# Patient Record
Sex: FEMALE | Race: WHITE | URBAN - METROPOLITAN AREA
[De-identification: names, ages, dates, MRNs, and addresses within clinical notes are randomized per-mention and may not be internally consistent; named-entity substitution may affect disease eponyms.]

---

## 2024-03-14 ENCOUNTER — INPATIENT (INPATIENT)
Facility: HOSPITAL | Age: 84
LOS: 0 days | Discharge: HOME | DRG: 86 | End: 2024-03-15
Attending: SURGERY | Admitting: SURGERY
Payer: MEDICARE

## 2024-03-14 VITALS
DIASTOLIC BLOOD PRESSURE: 86 MMHG | SYSTOLIC BLOOD PRESSURE: 134 MMHG | OXYGEN SATURATION: 98 % | RESPIRATION RATE: 18 BRPM | HEIGHT: 68 IN | HEART RATE: 91 BPM | TEMPERATURE: 98 F | WEIGHT: 166.01 LBS

## 2024-03-14 DIAGNOSIS — S06.5X0A TRAUMATIC SUBDURAL HEMORRHAGE WITHOUT LOSS OF CONSCIOUSNESS, INITIAL ENCOUNTER: ICD-10-CM

## 2024-03-14 DIAGNOSIS — Z90.49 ACQUIRED ABSENCE OF OTHER SPECIFIED PARTS OF DIGESTIVE TRACT: Chronic | ICD-10-CM

## 2024-03-14 LAB
ALBUMIN SERPL ELPH-MCNC: 3.9 G/DL — SIGNIFICANT CHANGE UP (ref 3.5–5.2)
ALP SERPL-CCNC: 135 U/L — HIGH (ref 30–115)
ALT FLD-CCNC: 52 U/L — HIGH (ref 0–41)
ANION GAP SERPL CALC-SCNC: 12 MMOL/L — SIGNIFICANT CHANGE UP (ref 7–14)
APTT BLD: 39.6 SEC — HIGH (ref 27–39.2)
AST SERPL-CCNC: 78 U/L — HIGH (ref 0–41)
BASOPHILS # BLD AUTO: 0.02 K/UL — SIGNIFICANT CHANGE UP (ref 0–0.2)
BASOPHILS NFR BLD AUTO: 0.3 % — SIGNIFICANT CHANGE UP (ref 0–1)
BILIRUB SERPL-MCNC: 2.6 MG/DL — HIGH (ref 0.2–1.2)
BUN SERPL-MCNC: 20 MG/DL — SIGNIFICANT CHANGE UP (ref 10–20)
CALCIUM SERPL-MCNC: 9.7 MG/DL — SIGNIFICANT CHANGE UP (ref 8.4–10.5)
CHLORIDE SERPL-SCNC: 99 MMOL/L — SIGNIFICANT CHANGE UP (ref 98–110)
CO2 SERPL-SCNC: 26 MMOL/L — SIGNIFICANT CHANGE UP (ref 17–32)
CREAT SERPL-MCNC: 0.7 MG/DL — SIGNIFICANT CHANGE UP (ref 0.7–1.5)
EGFR: 85 ML/MIN/1.73M2 — SIGNIFICANT CHANGE UP
EOSINOPHIL # BLD AUTO: 0.02 K/UL — SIGNIFICANT CHANGE UP (ref 0–0.7)
EOSINOPHIL NFR BLD AUTO: 0.3 % — SIGNIFICANT CHANGE UP (ref 0–8)
GLUCOSE SERPL-MCNC: 115 MG/DL — HIGH (ref 70–99)
HCT VFR BLD CALC: 38.6 % — SIGNIFICANT CHANGE UP (ref 37–47)
HGB BLD-MCNC: 12.9 G/DL — SIGNIFICANT CHANGE UP (ref 12–16)
IMM GRANULOCYTES NFR BLD AUTO: 0.3 % — SIGNIFICANT CHANGE UP (ref 0.1–0.3)
INR BLD: 2.54 RATIO — HIGH (ref 0.65–1.3)
LYMPHOCYTES # BLD AUTO: 1.17 K/UL — LOW (ref 1.2–3.4)
LYMPHOCYTES # BLD AUTO: 16.9 % — LOW (ref 20.5–51.1)
MCHC RBC-ENTMCNC: 31.2 PG — HIGH (ref 27–31)
MCHC RBC-ENTMCNC: 33.4 G/DL — SIGNIFICANT CHANGE UP (ref 32–37)
MCV RBC AUTO: 93.2 FL — SIGNIFICANT CHANGE UP (ref 81–99)
MONOCYTES # BLD AUTO: 0.42 K/UL — SIGNIFICANT CHANGE UP (ref 0.1–0.6)
MONOCYTES NFR BLD AUTO: 6.1 % — SIGNIFICANT CHANGE UP (ref 1.7–9.3)
NEUTROPHILS # BLD AUTO: 5.27 K/UL — SIGNIFICANT CHANGE UP (ref 1.4–6.5)
NEUTROPHILS NFR BLD AUTO: 76.1 % — HIGH (ref 42.2–75.2)
NRBC # BLD: 0 /100 WBCS — SIGNIFICANT CHANGE UP (ref 0–0)
PLATELET # BLD AUTO: 136 K/UL — SIGNIFICANT CHANGE UP (ref 130–400)
PMV BLD: 11.3 FL — HIGH (ref 7.4–10.4)
POTASSIUM SERPL-MCNC: 4.1 MMOL/L — SIGNIFICANT CHANGE UP (ref 3.5–5)
POTASSIUM SERPL-SCNC: 4.1 MMOL/L — SIGNIFICANT CHANGE UP (ref 3.5–5)
PROT SERPL-MCNC: 7.2 G/DL — SIGNIFICANT CHANGE UP (ref 6–8)
PROTHROM AB SERPL-ACNC: 29.2 SEC — HIGH (ref 9.95–12.87)
RBC # BLD: 4.14 M/UL — LOW (ref 4.2–5.4)
RBC # FLD: 15.3 % — HIGH (ref 11.5–14.5)
SODIUM SERPL-SCNC: 137 MMOL/L — SIGNIFICANT CHANGE UP (ref 135–146)
WBC # BLD: 6.92 K/UL — SIGNIFICANT CHANGE UP (ref 4.8–10.8)
WBC # FLD AUTO: 6.92 K/UL — SIGNIFICANT CHANGE UP (ref 4.8–10.8)

## 2024-03-14 PROCEDURE — 70450 CT HEAD/BRAIN W/O DYE: CPT | Mod: 26,MC

## 2024-03-14 PROCEDURE — 71045 X-RAY EXAM CHEST 1 VIEW: CPT | Mod: 26

## 2024-03-14 PROCEDURE — 99285 EMERGENCY DEPT VISIT HI MDM: CPT | Mod: FS,57

## 2024-03-14 PROCEDURE — 73110 X-RAY EXAM OF WRIST: CPT | Mod: RT

## 2024-03-14 PROCEDURE — 72125 CT NECK SPINE W/O DYE: CPT | Mod: 26,MC

## 2024-03-14 PROCEDURE — 72170 X-RAY EXAM OF PELVIS: CPT | Mod: 26

## 2024-03-14 PROCEDURE — 97163 PT EVAL HIGH COMPLEX 45 MIN: CPT | Mod: GP

## 2024-03-14 PROCEDURE — 70486 CT MAXILLOFACIAL W/O DYE: CPT | Mod: 26,MC

## 2024-03-14 PROCEDURE — 99222 1ST HOSP IP/OBS MODERATE 55: CPT | Mod: GC

## 2024-03-14 PROCEDURE — 73110 X-RAY EXAM OF WRIST: CPT | Mod: 26,RT

## 2024-03-14 PROCEDURE — 25675 CLTX DSTL RAD/ULN DISLC MNPJ: CPT | Mod: RT

## 2024-03-14 PROCEDURE — 73110 X-RAY EXAM OF WRIST: CPT | Mod: 26,RT,77

## 2024-03-14 PROCEDURE — 70450 CT HEAD/BRAIN W/O DYE: CPT | Mod: MC

## 2024-03-14 PROCEDURE — 25605 CLTX DST RDL FX/EPHYS SEP W/: CPT | Mod: 54,RT

## 2024-03-14 RX ORDER — SACUBITRIL AND VALSARTAN 24; 26 MG/1; MG/1
1 TABLET, FILM COATED ORAL
Refills: 0 | DISCHARGE

## 2024-03-14 RX ORDER — ACETAMINOPHEN 500 MG
975 TABLET ORAL ONCE
Refills: 0 | Status: COMPLETED | OUTPATIENT
Start: 2024-03-14 | End: 2024-03-14

## 2024-03-14 RX ORDER — METOPROLOL TARTRATE 50 MG
1 TABLET ORAL
Refills: 0 | DISCHARGE

## 2024-03-14 RX ORDER — SACUBITRIL AND VALSARTAN 24; 26 MG/1; MG/1
1 TABLET, FILM COATED ORAL
Refills: 0 | Status: DISCONTINUED | OUTPATIENT
Start: 2024-03-14 | End: 2024-03-15

## 2024-03-14 RX ORDER — SODIUM CHLORIDE 9 MG/ML
1000 INJECTION, SOLUTION INTRAVENOUS
Refills: 0 | Status: DISCONTINUED | OUTPATIENT
Start: 2024-03-14 | End: 2024-03-15

## 2024-03-14 RX ORDER — GABAPENTIN 400 MG/1
100 CAPSULE ORAL THREE TIMES A DAY
Refills: 0 | Status: DISCONTINUED | OUTPATIENT
Start: 2024-03-14 | End: 2024-03-15

## 2024-03-14 RX ORDER — TETANUS TOXOID, REDUCED DIPHTHERIA TOXOID AND ACELLULAR PERTUSSIS VACCINE, ADSORBED 5; 2.5; 8; 8; 2.5 [IU]/.5ML; [IU]/.5ML; UG/.5ML; UG/.5ML; UG/.5ML
0.5 SUSPENSION INTRAMUSCULAR ONCE
Refills: 0 | Status: COMPLETED | OUTPATIENT
Start: 2024-03-14 | End: 2024-03-14

## 2024-03-14 RX ORDER — METOPROLOL TARTRATE 50 MG
25 TABLET ORAL DAILY
Refills: 0 | Status: DISCONTINUED | OUTPATIENT
Start: 2024-03-14 | End: 2024-03-15

## 2024-03-14 RX ORDER — ACETAMINOPHEN 500 MG
650 TABLET ORAL EVERY 6 HOURS
Refills: 0 | Status: DISCONTINUED | OUTPATIENT
Start: 2024-03-14 | End: 2024-03-15

## 2024-03-14 RX ORDER — FUROSEMIDE 40 MG
1 TABLET ORAL
Refills: 0 | DISCHARGE

## 2024-03-14 RX ORDER — METHOCARBAMOL 500 MG/1
500 TABLET, FILM COATED ORAL
Refills: 0 | Status: DISCONTINUED | OUTPATIENT
Start: 2024-03-14 | End: 2024-03-15

## 2024-03-14 RX ORDER — FUROSEMIDE 40 MG
20 TABLET ORAL DAILY
Refills: 0 | Status: DISCONTINUED | OUTPATIENT
Start: 2024-03-14 | End: 2024-03-15

## 2024-03-14 RX ADMIN — SODIUM CHLORIDE 110 MILLILITER(S): 9 INJECTION, SOLUTION INTRAVENOUS at 23:58

## 2024-03-14 RX ADMIN — TETANUS TOXOID, REDUCED DIPHTHERIA TOXOID AND ACELLULAR PERTUSSIS VACCINE, ADSORBED 0.5 MILLILITER(S): 5; 2.5; 8; 8; 2.5 SUSPENSION INTRAMUSCULAR at 18:04

## 2024-03-14 RX ADMIN — Medication 650 MILLIGRAM(S): at 23:56

## 2024-03-14 RX ADMIN — Medication 975 MILLIGRAM(S): at 22:59

## 2024-03-14 RX ADMIN — METHOCARBAMOL 500 MILLIGRAM(S): 500 TABLET, FILM COATED ORAL at 23:56

## 2024-03-14 NOTE — H&P ADULT - ATTENDING COMMENTS
Trauma Attending H&P Attestation    Patient seen and evaluated with the trauma team in the trauma bay upon arrival. All pertinent labs and radiographic imaging reviewed, pending final reports. Outpatient medications reviewed, including the presence of anticoagulants, if applicable. I agree with the resident's note above, including the physical exam findings, assessment and plan as documented with the following adjustments.     Trauma Level: [ ] Code  [x ] Alert  [ ] Consult [ ] Transfer in  Patient is seen at the bedside at 21:25  Activation by:  [ x] ED physician [x ] EMS  Intubated in Field? [ ] Yes [x ] No  Intubated in ED? [ ] Yes [x ] No  Intubated in Trauma [place]? [ ] Yes [x ] No    JCOY SOUSA Patient is a 83y old  Female who presents with a chief complaint of s/p fall with small SDH and wrist fracture      Patient presented with GCS [15 ]  upon arrival to the trauma bay.  Allergies  No Known Allergies  Intolerances    PAST MEDICAL & SURGICAL HISTORY:  HTN (hypertension)  Atrial fibrillation  S/P cholecystectomy    On AC/Antiplatelets [x ] Yes [ ] No              [x ] NOVACs, [ ] Coumadin, [ ] ASA, [ ] Antiplatelets     Vital Signs Last 24 Hrs  T(C): 35.9 (14 Mar 2024 21:15), Max: 36.7 (14 Mar 2024 18:53)  T(F): 96.7 (14 Mar 2024 21:15), Max: 98 (14 Mar 2024 18:53)  HR: 86 (14 Mar 2024 21:15) (84 - 91)  BP: 128/72 (14 Mar 2024 21:15) (105/74 - 134/86)  BP(mean): --  RR: 17 (14 Mar 2024 21:07) (17 - 18)  SpO2: 98% (14 Mar 2024 21:15) (96% - 99%)    Parameters below as of 14 Mar 2024 21:15  Patient On (Oxygen Delivery Method): room air      PE: wrist deformity  Assessment: Fall on Xarelto                             wrist fracture                             small SDH  PLAN  - Admit to Trauma service/ASF  - supportive care  - GI/DVT prophylaxis  - pain management  - repeat studies as needed  - complete and follow up on trauma work up included but not limites to                          [x ] CXR [ x] PXR [x ] Extremities X-RAYs                          [ x] NCHCT [x ] C-Spine CT [x] CT fascial bones [ x] CT Chest [x ] CT Abdomen/Pelvis                          [ x] FAST [ ] Other                          [x ] Trauma Labs   [ ] Toxicology   - Follow up Consults  [ x] Neurosurgery [x ] Orthopaedics [ ] Plastics [ ] Fascial/OMFS [ ] Opthalmology   [ ] Urology  [ ] ENT                                          [ ] Medicine [ ] Geriatrics [ ] Cardiology/EP [ ] Hospice/Palliative Care                                        [ ] Pediatric ICU  [ ] SICU/SDU [ ] Burn/Burn ICU  - IV ABx give as indicated [ ] Yes [x ] No  - Tetanus given as indicated [ ] Yes [ x] No  - Seizures prophylaxis  [ ] Yes,  [x ] No  - repeat NCHCT      Ashley Pereyra MD, FACS  Trauma/ACS/Surgical Critical Care Attending

## 2024-03-14 NOTE — ED ADULT NURSE NOTE - NSSEPSISSUSPECTED_ED_A_ED
No S/p Peg 8/26 by GI   MBS 9/28: Inconsistent Aspiration episodes with varying consistencies   Continue Non-Oral means of Nutrition  -Speech and swallow reconsulted 10/9, recommend MBS, however stated chances of passing study improved if trach decannulated.

## 2024-03-14 NOTE — H&P ADULT - NSHPPHYSICALEXAM_GEN_ALL_CORE
General: NAD  HEENT: Normocephalic, atraumatic, EOMI, PEERLA. no scalp lacerations, dried epistaxis from R nare, small abrasion over R cheek  Neck: Soft, midline trachea. no c-spine tenderness  Chest: No chest wall tenderness, no subcutaneous emphysema   Cardiac: Extremities well perfused  Respiratory: Normal respiratory effort  Abdomen: Soft, non-distended, non-tender, no rebound, no guarding.  Groin: Normal appearing, pelvis stable   Ext: RUE in split, full ROM, capillary refill <2 secs, sensation grossly intact,  moving b/l upper and lower extremities. Palpable Radial b/l UE, b/l DP palpable in LE.   Back: No T/L/S spine tenderness, No palpable runoff/stepoff/deformity      Vital Signs Last 24 Hrs  T(C): 35.9 (14 Mar 2024 21:15), Max: 36.7 (14 Mar 2024 18:53)  T(F): 96.7 (14 Mar 2024 21:15), Max: 98 (14 Mar 2024 18:53)  HR: 86 (14 Mar 2024 21:15) (84 - 91)  BP: 128/72 (14 Mar 2024 21:15) (105/74 - 134/86)  BP(mean): --  RR: 17 (14 Mar 2024 21:07) (17 - 18)  SpO2: 98% (14 Mar 2024 21:15) (96% - 99%)    Parameters below as of 14 Mar 2024 21:15  Patient On (Oxygen Delivery Method): room air

## 2024-03-14 NOTE — ED PROVIDER NOTE - PROGRESS NOTE DETAILS
call from radiologist regarding thin SDH.  Pt and family made aware of findings.  Pt splinted. Will transfer [facility]. Dr Lira aware. Dr Coleman accepts DC: patient arrived north. asymptomatic. last dose of xarelto was last night. Nsx, evaluated patient, no need to reverse AC at this time. xrays with displaced colles, will attempt reduction. ct max/face, ct cervical spine added.

## 2024-03-14 NOTE — ED PROVIDER NOTE - CARE PLAN
Principal Discharge DX:	Traumatic subdural hematoma without loss of consciousness  Secondary Diagnosis:	Closed traumatic displaced Colles' fracture of right radius   1

## 2024-03-14 NOTE — ED PROVIDER NOTE - CLINICAL SUMMARY MEDICAL DECISION MAKING FREE TEXT BOX
Patient transferred [place] for NSX/trauma eval.   84 yo F With history of A-fib on Xarelto, last dose was last night presenting to the ED for mechanical fall sustaining superficial lacerations to the bridge of the nose.  No C.  CT head with small subdural hematoma as well as a displaced right Colles' fracture.  Patient transferred [place], neurosurgery and trauma evaluated patient, CT max face as well as CT C-spine with no acute pathology.  Chest x-ray images independently interpreted by me Dr. Mendoza showing no pneumothorax, effusions noted- patient denies sob/cough. Colles fracture attempted to reduce in ED. ortho consult appreciated. admitted to SDU for further management.

## 2024-03-14 NOTE — H&P ADULT - NSHPLABSRESULTS_GEN_ALL_CORE
Labs:  CAPILLARY BLOOD GLUCOSE                          12.9   6.92  )-----------( 136      ( 14 Mar 2024 19:33 )             38.6       Auto Neutrophil %: 76.1 % (03-14-24 @ 19:33)  Auto Immature Granulocyte %: 0.3 % (03-14-24 @ 19:33)    03-14    137  |  99  |  20  ----------------------------<  115<H>  4.1   |  26  |  0.7      Calcium: 9.7 mg/dL (03-14-24 @ 19:33)      LFTs:             7.2  | 2.6  | 78       ------------------[135     ( 14 Mar 2024 19:33 )  3.9  | x    | 52          Lipase:x      Amylase:x             Coags:     29.20  ----< 2.54    ( 14 Mar 2024 19:33 )     39.6          Urinalysis Basic - ( 14 Mar 2024 19:33 )    Color: x / Appearance: x / SG: x / pH: x  Gluc: 115 mg/dL / Ketone: x  / Bili: x / Urobili: x   Blood: x / Protein: x / Nitrite: x   Leuk Esterase: x / RBC: x / WBC x   Sq Epi: x / Non Sq Epi: x / Bacteria: x    Radiolgy: Labs:  CAPILLARY BLOOD GLUCOSE                          12.9   6.92  )-----------( 136      ( 14 Mar 2024 19:33 )             38.6       Auto Neutrophil %: 76.1 % (03-14-24 @ 19:33)  Auto Immature Granulocyte %: 0.3 % (03-14-24 @ 19:33)    03-14    137  |  99  |  20  ----------------------------<  115<H>  4.1   |  26  |  0.7      Calcium: 9.7 mg/dL (03-14-24 @ 19:33)      LFTs:             7.2  | 2.6  | 78       ------------------[135     ( 14 Mar 2024 19:33 )  3.9  | x    | 52          Lipase:x      Amylase:x             Coags:     29.20  ----< 2.54    ( 14 Mar 2024 19:33 )     39.6          Urinalysis Basic - ( 14 Mar 2024 19:33 )    Color: x / Appearance: x / SG: x / pH: x  Gluc: 115 mg/dL / Ketone: x  / Bili: x / Urobili: x   Blood: x / Protein: x / Nitrite: x   Leuk Esterase: x / RBC: x / WBC x   Sq Epi: x / Non Sq Epi: x / Bacteria: x    Radiolgy:  CT Head No Cont (03.14.24 @ 17:55)     Thin acute subdural hemorrhage noted along the anterior falx without mass   effect or midline shift.    Mild chronic microvascular type changes.      Xray Chest 1 View-PORTABLE IMMEDIATE (Xray Chest 1 View-PORTABLE IMMEDIATE .) (03.14.24 @ 19:04)     Mild congestive changes with small bilateral pleural effusions      Xray Pelvis AP only (03.14.24 @ 19:05)    Study limited by overlying external material/clothing, particularly   evaluation of the bilateral femurs is limited. Otherwise, no definitive   evidence of acute displaced fracture elsewhere. Degenerative changes.   Osteopenia. Pelvic calcifications.       CT Cervical Spine No Cont (03.14.24 @ 21:48)      No evidence of acute fracture of the cervical spine.      CT Maxillofacial No Cont (03.14.24 @ 21:48)    Unremarkable CT maxillofacial.

## 2024-03-14 NOTE — ED PROVIDER NOTE - OBJECTIVE STATEMENT
84 y/o female on xarelto presents to the ed s/p mechanical fall today on sidewalk . patient with right wrist pain and swelling. no tingling distally. no elbow or shoulder pain. no neck or back pain . patient ambulatory . no headaches, dizziness, visual changes. patient with abrasion and swelling to lower lip. patient with good rom of jaw.

## 2024-03-14 NOTE — ED ADULT NURSE REASSESSMENT NOTE - NS ED NURSE REASSESS COMMENT FT1
Report given to GWENDOLYN Ignacio RN. Vital signs and history was given. Tylenol 650mg and robaxin 500mg oral will be given prior to pt being transferred upstairs.

## 2024-03-14 NOTE — CONSULT NOTE ADULT - SUBJECTIVE AND OBJECTIVE BOX
Neurosurgery  Consult    Patient is a 83y old  Female who presents with a chief complaint of fall     HPI:  82 y/o female on xarelto presents to the ed s/p mechanical fall today on sidewalk . patient with right wrist pain and swelling. no tingling distally. no elbow or shoulder pain. no neck or back pain . Patient ambulatory . no headaches, dizziness, visual changes. patient with abrasion and swelling to lower lip. patient with good rom of jaw.          PAST MEDICAL & SURGICAL HISTORY:  HTN (hypertension)  Atrial fibrillation          FAMILY HISTORY:      Social History: (-) x 3    Allergies    No Known Allergies    Intolerances        MEDICATIONS  (STANDING):    MEDICATIONS  (PRN):      Review of systems:    Constitutional: No fever, weight loss or fatigue    Eyes: No eye pain or discharge  ENMT:  No difficulty hearing; No sinus or throat pain  Neck: No pain or stiffness  Respiratory: No cough, wheezing, chills or hemoptysis  Cardiovascular: No chest pain, palpitations, shortness of breath, dyspnea on exertion  Gastrointestinal: No abdominal pain, nausea, vomiting or hematemesis; No diarrhea or constipation.   Genitourinary: No dysuria, frequency, hematuria or incontinence  Neurological: As per HPI  Skin: No rashes or lesions   Endocrine: No heat or cold intolerance; No hair loss  Musculoskeletal: No joint pain or swelling  Psychiatric: No depression, anxiety, mood swings  Heme/Lymph: No easy bruising or bleeding gums    Vital Signs Last 24 Hrs  T(C): 35.9 (14 Mar 2024 21:15), Max: 36.7 (14 Mar 2024 18:53)  T(F): 96.7 (14 Mar 2024 21:15), Max: 98 (14 Mar 2024 18:53)  HR: 86 (14 Mar 2024 21:15) (84 - 91)  BP: 128/72 (14 Mar 2024 21:15) (105/74 - 134/86)  BP(mean): --  RR: 17 (14 Mar 2024 21:07) (17 - 18)  SpO2: 98% (14 Mar 2024 21:15) (96% - 99%)    Parameters below as of 14 Mar 2024 21:15  Patient On (Oxygen Delivery Method): room air        Neurologic Examination:  General:  Appearance is consistent with chronologic age.  No abnormal facies.   General: The patient is oriented to person, place, time and date.  Recent and remote memory intact.  Fund of knowledge is intact and normal.  Language with normal repetition, comprehension and naming.  Nondysarthric.    Cranial nerves: intact VA, VFF.  EOMI w/o nystagmus, skew or reported double vision.  PERRL.  No ptosis/weakness of eyelid closure.  Facial sensation is normal with normal bite.  No facial asymmetry.  Hearing grossly intact b/l.  Palate elevates midline.    Motor examination:   Normal tone, bulk and range of motion.  No tenderness, twitching, tremors or involuntary movements.  Formal Muscle Strength Testing: LOPEZ X 4 RUE in splint/broken.  No observable drift.  Sensory examination:   Intact to light touch and pinprick in all extremities.  Cerebellum:   FTN/HKS intact with normal SAUL in all limbs.  No dysmetria or dysdiadokinesia.    Labs:   CBC Full  -  ( 14 Mar 2024 19:33 )  WBC Count : 6.92 K/uL  RBC Count : 4.14 M/uL  Hemoglobin : 12.9 g/dL  Hematocrit : 38.6 %  Platelet Count - Automated : 136 K/uL  Mean Cell Volume : 93.2 fL  Mean Cell Hemoglobin : 31.2 pg  Mean Cell Hemoglobin Concentration : 33.4 g/dL  Auto Neutrophil # : 5.27 K/uL  Auto Lymphocyte # : 1.17 K/uL  Auto Monocyte # : 0.42 K/uL  Auto Eosinophil # : 0.02 K/uL  Auto Basophil # : 0.02 K/uL  Auto Neutrophil % : 76.1 %  Auto Lymphocyte % : 16.9 %  Auto Monocyte % : 6.1 %  Auto Eosinophil % : 0.3 %  Auto Basophil % : 0.3 %    03-14    137  |  99  |  20  ----------------------------<  115<H>  4.1   |  26  |  0.7    Ca    9.7      14 Mar 2024 19:33    TPro  7.2  /  Alb  3.9  /  TBili  2.6<H>  /  DBili  x   /  AST  78<H>  /  ALT  52<H>  /  AlkPhos  135<H>  03-14    LIVER FUNCTIONS - ( 14 Mar 2024 19:33 )  Alb: 3.9 g/dL / Pro: 7.2 g/dL / ALK PHOS: 135 U/L / ALT: 52 U/L / AST: 78 U/L / GGT: x           PT/INR - ( 14 Mar 2024 19:33 )   PT: 29.20 sec;   INR: 2.54 ratio         PTT - ( 14 Mar 2024 19:33 )  PTT:39.6 sec  Urinalysis Basic - ( 14 Mar 2024 19:33 )    Color: x / Appearance: x / SG: x / pH: x  Gluc: 115 mg/dL / Ketone: x  / Bili: x / Urobili: x   Blood: x / Protein: x / Nitrite: x   Leuk Esterase: x / RBC: x / WBC x   Sq Epi: x / Non Sq Epi: x / Bacteria: x          Neuroimaging:  NCHCT: CT Head No Cont:   ACC: [id number] EXAM:  CT BRAIN   ORDERED BY: MAURA ORTEZ     PROCEDURE DATE:  03/14/2024          INTERPRETATION:  CLINICAL INDICATION: Head injury.    Technique: CT of the head was performed without contrast.    Multiple contiguous axial imageswere acquired from the skullbase to the   vertex without the administration of intravenous contrast.  Coronal and   sagittal reformations were made.    COMPARISON:  prior head CT dated 6/28/2011.    FINDINGS:  There is thin acute subdural hemorrhage noted along the anterior falx   measuring approximately 2 mm without mass effect or midline shift, series   2 image 8.    There is patchy periventricular and subcortical white matter hypodensity.   Ventricles and sulci are otherwise unremarkable. There is no   hydrocephalus.    The calvarium is intact. The visualized intraorbital compartments,   paranasal sinuses and mastoid complexes appear free of acute disease.    IMPRESSION:  Thin acute subdural hemorrhage noted along the anterior falx without mass   effect or midline shift.    Mild chronic microvascular type changes.    These findings were discussed with Dr. Rincon at 3/14/2024 6:38 PM by   Dr. Perez with read back confirmation.    --- End of Report ---      JAE PEREZ MD; Attending Radiologist  This document has been electronically signed. Mar 14 2024  6:39PM (03-14-24 @ 17:55)        Assessment:  This is a 83y Female with h/o HTN, afib on AC presents after a mechanical fall with skim acute SDH along the flax    Plan:  No acute neurosurgical intervention warranted at this time           Rpt HCT in 6-8H           Neuro checks q 4H           No reversal  - last dose of Xarelto last night > 24H Hold further doses of Xarelto           Keep HOB at 30           Control SBP below 160      Case discussed and films reviewed with Dr Merrill.  -   03-14-24 @ 22:05

## 2024-03-14 NOTE — H&P ADULT - ASSESSMENT
TRAUMA ACTIVATION LEVEL:  CODE / ALERT  / CONSULT  ACTIVATED BY: EMS**  /  ED**  INTUBATED: YES** / NO**      MECHANISM OF INJURY:   [] Blunt     [] MVC	  [] Fall	  [] Pedestrian Struck	  [] Motorcycle     [] Assault     [] Bicycle collision    [] Sports injury    [] Penetrating    [] Gun Shot Wound      [] Stab Wound    GCS: 15 	E: 4	V: 5	M: 6    HPI:    83yF w/ PMHx of *** seen as (Code Trauma / Trauma Alert / Trauma Consult) s/p ******.  Trauma assessment in ED: ABCs intact , GCS 15 , AAOx3.    PAST MEDICAL & SURGICAL HISTORY:  HTN (hypertension)      Atrial fibrillation      S/P cholecystectomy          Allergies    No Known Allergies    Intolerances        Home Medications:  Entresto 24 mg-26 mg oral tablet: 1 tab(s) orally 2 times a day (14 Mar 2024 23:11)  furosemide 20 mg oral tablet: 1 tab(s) orally once a day (14 Mar 2024 23:12)  metoprolol succinate 25 mg oral capsule, extended release: 1 cap(s) orally once a day (14 Mar 2024 23:12)  Xarelto 20 mg oral tablet: 1 tab(s) orally once a day (14 Mar 2024 23:11)      ROS: 10-system review is otherwise negative except HPI above.      Primary Survey:    A - airway intact  B - bilateral breath sounds and good chest rise  C - palpable pulses in all extremities  D - GCS 15 on arrival, LOPEZ  Exposure obtained            Secondary Survey:   General: NAD  HEENT: Normocephalic, atraumatic, EOMI, PEERLA. no scalp lacerations   Neck: Soft, midline trachea. no c-spine tenderness  Chest: No chest wall tenderness, no subcutaneous emphysema   Cardiac: S1, S2, RRR  Respiratory: Bilateral breath sounds, clear and equal bilaterally  Abdomen: Soft, non-distended, non-tender, no rebound, no guarding.  Groin: Normal appearing, pelvis stable   Ext:  Moving b/l upper and lower extremities. Palpable Radial b/l UE, b/l DP palpable in LE.   Back: No T/L/S spine tenderness, No palpable runoff/stepoff/deformity  Rectal: No juliet blood, GINA with good tone    FAST: *****/Negative    ACCESS / DEVICES:  [ ] Peripheral IV  [ ] Central Venous Line	[ ] R	[ ] L	[ ] IJ	[ ] Fem	[ ] SC	Placed:   [ ] Arterial Line		[ ] R	[ ] L	[ ] Fem	[ ] Rad	[ ] Ax	Placed:   [ ] PICC:					[ ] Mediport  [ ] Urinary Catheter,  Date Placed:   [ ] Chest tube: [ ] Right, [ ] Left  [ ] MARCIO/Kevin Drains    Labs:  CAPILLARY BLOOD GLUCOSE                              12.9   6.92  )-----------( 136      ( 14 Mar 2024 19:33 )             38.6       Auto Neutrophil %: 76.1 % (03-14-24 @ 19:33)  Auto Immature Granulocyte %: 0.3 % (03-14-24 @ 19:33)    03-14    137  |  99  |  20  ----------------------------<  115<H>  4.1   |  26  |  0.7      Calcium: 9.7 mg/dL (03-14-24 @ 19:33)      LFTs:             7.2  | 2.6  | 78       ------------------[135     ( 14 Mar 2024 19:33 )  3.9  | x    | 52          Lipase:x      Amylase:x             Coags:     29.20  ----< 2.54    ( 14 Mar 2024 19:33 )     39.6                Urinalysis Basic - ( 14 Mar 2024 19:33 )    Color: x / Appearance: x / SG: x / pH: x  Gluc: 115 mg/dL / Ketone: x  / Bili: x / Urobili: x   Blood: x / Protein: x / Nitrite: x   Leuk Esterase: x / RBC: x / WBC x   Sq Epi: x / Non Sq Epi: x / Bacteria: x                RADIOLOGY & ADDITIONAL STUDIES:  ---------------------------------------------------------------------------------------    ASSESSMENT:  83y Female  w/ PMHx of *** seen as (Code Trauma / Trauma Alert / Trauma Consult) s/p ****** with complaint of *** , external signs of trauma include *** . Trauma assessment in ED: ABCs intact , GCS 15 , AAOx3,  LOPEZ.     Injuries identified:   -   -   -     PLAN:   - Trauma Labs: (CBC, BMP, Coags, T&S, UA, EtOH level)  Additional studies:  EKG  Utox    Trauma Imaging to include the following:  - CXR, Pelvic Xray  - CT Head,  CT C-spine, CT Max/Face, CT Chest, CT Abd/Pelvis  - Extremity films: None    Additional consultations:  - Neurosurgery  - Orthopedics  - OMFS  - PT/Rehab/SW  - Hospitalist/Medicine     Disposition pending results of above labs and imaging  Above plan discussed with Trauma attending,  ***  , patient, patient family, and ED team  --------------------------------------------------------------------------------------  03-14-24 @ 23:14 TRAUMA ACTIVATION LEVEL:  CODE / ALERT  / CONSULT  ACTIVATED BY: EMS**  /  ED**  INTUBATED: YES** / NO**      MECHANISM OF INJURY:   [] Blunt     [] MVC	  [] Fall	  [] Pedestrian Struck	  [] Motorcycle     [] Assault     [] Bicycle collision    [] Sports injury    [] Penetrating    [] Gun Shot Wound      [] Stab Wound    GCS: 15 	E: 4	V: 5	M: 6    HPI:    83yF w/ PMHx of *** seen as (Code Trauma / Trauma Alert / Trauma Consult) s/p ******.  Trauma assessment in ED: ABCs intact , GCS 15 , AAOx3.    PAST MEDICAL & SURGICAL HISTORY:  HTN (hypertension)      Atrial fibrillation      S/P cholecystectomy          Allergies    No Known Allergies    Intolerances        Home Medications:  Entresto 24 mg-26 mg oral tablet: 1 tab(s) orally 2 times a day (14 Mar 2024 23:11)  furosemide 20 mg oral tablet: 1 tab(s) orally once a day (14 Mar 2024 23:12)  metoprolol succinate 25 mg oral capsule, extended release: 1 cap(s) orally once a day (14 Mar 2024 23:12)  Xarelto 20 mg oral tablet: 1 tab(s) orally once a day (14 Mar 2024 23:11)      ROS: 10-system review is otherwise negative except HPI above.      Primary Survey:    A - airway intact  B - bilateral breath sounds and good chest rise  C - palpable pulses in all extremities  D - GCS 15 on arrival, LOPEZ  Exposure obtained            Secondary Survey:   General: NAD  HEENT: Normocephalic, atraumatic, EOMI, PEERLA. no scalp lacerations   Neck: Soft, midline trachea. no c-spine tenderness  Chest: No chest wall tenderness, no subcutaneous emphysema   Cardiac: S1, S2, RRR  Respiratory: Bilateral breath sounds, clear and equal bilaterally  Abdomen: Soft, non-distended, non-tender, no rebound, no guarding.  Groin: Normal appearing, pelvis stable   Ext:  Moving b/l upper and lower extremities. Palpable Radial b/l UE, b/l DP palpable in LE.   Back: No T/L/S spine tenderness, No palpable runoff/stepoff/deformity  Rectal: No juliet blood, GINA with good tone    FAST: *****/Negative    ACCESS / DEVICES:  [ ] Peripheral IV  [ ] Central Venous Line	[ ] R	[ ] L	[ ] IJ	[ ] Fem	[ ] SC	Placed:   [ ] Arterial Line		[ ] R	[ ] L	[ ] Fem	[ ] Rad	[ ] Ax	Placed:   [ ] PICC:					[ ] Mediport  [ ] Urinary Catheter,  Date Placed:   [ ] Chest tube: [ ] Right, [ ] Left  [ ] MARCIO/Kevin Drains                    RADIOLOGY & ADDITIONAL STUDIES:  ---------------------------------------------------------------------------------------    ASSESSMENT:  83y Female  w/ PMHx of *** seen as (Code Trauma / Trauma Alert / Trauma Consult) s/p ****** with complaint of *** , external signs of trauma include *** . Trauma assessment in ED: ABCs intact , GCS 15 , AAOx3,  LOPEZ.     Injuries identified:   -   -   -     PLAN:   - Trauma Labs: (CBC, BMP, Coags, T&S, UA, EtOH level)  Additional studies:  EKG  Utox    Trauma Imaging to include the following:  - CXR, Pelvic Xray  - CT Head,  CT C-spine, CT Max/Face, CT Chest, CT Abd/Pelvis  - Extremity films: None    Additional consultations:  - Neurosurgery  - Orthopedics  - OMFS  - PT/Rehab/SW  - Hospitalist/Medicine     Disposition pending results of above labs and imaging  Above plan discussed with Trauma attending,  ***  , patient, patient family, and ED team  --------------------------------------------------------------------------------------  03-14-24 @ 23:14 Assessment:  Patient is a 82 y/o F with a past medical history significant for Atrial fibrillation on xarelto, HTN, and s/p cholecystectomy x 30 yrs prior who presents to the ED as a transfer from [facility] as trauma alert with chief complaint of R wrist pain s/p mechanical fall.  Trauma assessment in ED: ABCs intact , GCS 15 , AAOx3. On imaging studies, patient found to have thin acute subdural hemorrhage along the anterior falx as well as distal R ulnar fracture s/p ED splinting. Patient currently without focal neurological deficits, changes in strength or sensation, and is AOx3 GCS 15, without headache or dizziness. Continues to have RUE pain, s/p splinting in ED.      Plan:  - Admit to floor under trauma surgery service  - Q4hr neurochecks, repeat CT head 6-8hrs from previous CT head, cont hold xarelto for 24 hrs, BP control  - Orthopedics c/s for R distal ulnar fx  - Monitor vitals and respiratory status  - Pain control  - Encourage ambulation as tolerated  - Monitor urine output  - Strict IOs    Disposition pending results of above labs and imaging  Above plan discussed with Trauma attending, Dr. Pereyra  , patient, patient family, and ED team  --------------------------------------------------------------------------------------  03-14-24 @ 23:14    Fatigue: How much time during the previous 4 weeks did you feel tired?   All or most of the time [   ] Yes (1pt)    [x  ] No  (0pts)  Resistance: Do you have any difficulty walking up 10 steps alone without resting and without aids? [   ] Yes (1pt)    [  x] No  (0pts)  Ambulation: Do you have any difficulty walking several hundred yards alone without aids? [x   ] Yes (1pt)    [  ] No  (0pts)  Illness: how many illnesses do you have out of list of 11 total? [   ] 5 or more (1pt) [ x ] < 5 (0pts)  Loss of weight: Have you had weight loss of 5% or more? [   ] Yes (1pt)    [ x ] No  (0pts)    Total Score: 1    Score 1-2: Consult medical comangement  Score 3-4: Consult Geriatric service   Score 5: Consult Palliative service [id number]    Vitcoriano Dunbar, Iván WOOD, Amadou DUENAS, Max BLANKENSHIP. Frality: toward a clinical definition. J AM Med Dir Assoc. 2008; 9 (2): 71-72  Therese JE, Nicole TK, Mercado DK. A simple frailty questionnaire (FRAIL) predicts outcomes in middle aged  Americans. J Nutr Health Aging. 2012; 16 (7): 601-608 Assessment:  Patient is a 82 y/o F with a past medical history significant for Atrial fibrillation on xarelto, HTN, and s/p cholecystectomy x 30 yrs prior who presents to the ED as a transfer from [facility] as trauma alert with chief complaint of R wrist pain s/p mechanical fall.  Trauma assessment in ED: ABCs intact , GCS 15 , AAOx3. On imaging studies, patient found to have thin acute subdural hemorrhage along the anterior falx as well as distal R ulnar fracture s/p ED splinting. Patient currently without focal neurological deficits, changes in strength or sensation, and is AOx3 GCS 15, without headache or dizziness. Continues to have RUE pain, s/p splinting in ED.      Plan:  - Admit to floor under trauma surgery service  - Q4hr neurochecks, repeat CT head 6-8hrs from previous CT head, cont hold xarelto for 24 hrs, BP control  - Orthopedics c/s for R distal ulnar fx, s/p ED splinting of RUE, f/u repeat RUE plain films  - Monitor vitals and respiratory status  - Pain control  - Encourage ambulation as tolerated  - Monitor urine output  - Strict IOs    Disposition pending results of above labs and imaging  Above plan discussed with Trauma attending, Dr. Pereyra  , patient, patient family, and ED team  --------------------------------------------------------------------------------------  03-14-24 @ 23:14    Fatigue: How much time during the previous 4 weeks did you feel tired?   All or most of the time [   ] Yes (1pt)    [x  ] No  (0pts)  Resistance: Do you have any difficulty walking up 10 steps alone without resting and without aids? [   ] Yes (1pt)    [  x] No  (0pts)  Ambulation: Do you have any difficulty walking several hundred yards alone without aids? [x   ] Yes (1pt)    [  ] No  (0pts)  Illness: how many illnesses do you have out of list of 11 total? [   ] 5 or more (1pt) [ x ] < 5 (0pts)  Loss of weight: Have you had weight loss of 5% or more? [   ] Yes (1pt)    [ x ] No  (0pts)    Total Score: 1    Score 1-2: Consult medical comangement  Score 3-4: Consult Geriatric service   Score 5: Consult Palliative service [id number]    Victoriano Hubbard G, Iván WOOD, Amadou DUENAS, Max B. Frality: toward a clinical definition. J AM Med Dir Assoc. 2008; 9 (2): 71-72  Therese JE, Nicole TK, Mercado DK. A simple frailty questionnaire (FRAIL) predicts outcomes in middle aged  Americans. J Nutr Health Aging. 2012; 16 (7): 601-608 Assessment:  Patient is a 82 y/o F with a past medical history significant for Atrial fibrillation on xarelto, HTN, and s/p cholecystectomy x 30 yrs prior who presents to the ED as a transfer from [facility] as trauma alert with chief complaint of R wrist pain s/p mechanical fall.  Trauma assessment in ED: ABCs intact , GCS 15 , AAOx3. On imaging studies, patient found to have thin acute subdural hemorrhage along the anterior falx as well as distal R ulnar fracture s/p ED splinting. Patient currently without focal neurological deficits, changes in strength or sensation, and is AOx3 GCS 15, without headache or dizziness. Continues to have RUE pain, s/p splinting in ED.      Plan:  - Admit to floor under trauma surgery service  - Q4hr neurochecks, repeat CT head 6-8hrs from previous CT head, cont hold xarelto for 24 hrs, BP control  - Orthopedics c/s for R distal ulnar fx, s/p ED reduction and splinting of RUE, f/u repeat RUE plain films  - Monitor vitals and respiratory status  - Pain control  - Encourage ambulation as tolerated  - Monitor urine output  - Strict IOs    Disposition pending results of above labs and imaging  Above plan discussed with Trauma attending, Dr. Pereyra  , patient, patient family, and ED team  --------------------------------------------------------------------------------------  03-14-24 @ 23:14    Fatigue: How much time during the previous 4 weeks did you feel tired?   All or most of the time [   ] Yes (1pt)    [x  ] No  (0pts)  Resistance: Do you have any difficulty walking up 10 steps alone without resting and without aids? [   ] Yes (1pt)    [  x] No  (0pts)  Ambulation: Do you have any difficulty walking several hundred yards alone without aids? [x   ] Yes (1pt)    [  ] No  (0pts)  Illness: how many illnesses do you have out of list of 11 total? [   ] 5 or more (1pt) [ x ] < 5 (0pts)  Loss of weight: Have you had weight loss of 5% or more? [   ] Yes (1pt)    [ x ] No  (0pts)    Total Score: 1    Score 1-2: Consult medical comangement  Score 3-4: Consult Geriatric service   Score 5: Consult Palliative service [id number]    Victoriano Hubbard G, Iván WOOD, Amadou DUENAS, Max B. Frality: toward a clinical definition. J AM Med Dir Assoc. 2008; 9 (2): 71-72  Therese JE, Nicole TK, Mercado DK. A simple frailty questionnaire (FRAIL) predicts outcomes in middle aged  Americans. J Nutr Health Aging. 2012; 16 (7): 601-608

## 2024-03-14 NOTE — ED ADULT NURSE NOTE - NSSUHOSCREENINGYN_ED_ALL_ED
"35w6d    Chief Complaint   Patient presents with     Prenatal Care      discuss induction--took amoxicillan but still uncomfortabe then was prescribed diflucan on Friday but that didn't help either--still very uncomfortable       Initial /72 (BP Location: Right arm, Patient Position: Sitting, Cuff Size: Adult Regular)   Ht 1.676 m (5' 6\")   Wt 84.8 kg (186 lb 14.4 oz)   LMP  (LMP Unknown)   BMI 30.17 kg/m   Estimated body mass index is 30.17 kg/m  as calculated from the following:    Height as of this encounter: 1.676 m (5' 6\").    Weight as of this encounter: 84.8 kg (186 lb 14.4 oz).  BP completed using cuff size: regular    Questioned patient about current smoking habits.  Pt. has never smoked.          The following HM Due: NONE         " Yes - the patient is able to be screened

## 2024-03-14 NOTE — ED PROVIDER NOTE - PHYSICAL EXAMINATION
generalized: comfortable, alert , normocephalic  eyes: PERRLA, EOMI, no orbital tenderness, no bony step off  ENT: no septal hematoma, no nasal bone tenderness, +laceration to lower lip with surrounding abrasion, no dental injury, no gum swelling, no tongue swelling and uvula midline, oropharynx clear,   resp: CTA, no bony step off , no chest wall tenderness, no bruising to chest wall  cardiac: RRR S1S2  abd: non tender RUQ or LUQ, non distended, no bruising   msk: neck supple, no cervical tenderness, pelvis stable , no vertebral tenderness- flexion and extension in tact, gait steady, upper extremities - +tenderness right wrist with swelling and deformity, good rom of digits, pulses in tact, lower extremities- good rom , no tenderness  skin: no lacerations, bruising or swelling   neuro: alert and oriented, follows commands, no sensory or motor deficits

## 2024-03-14 NOTE — ED PROVIDER NOTE - ATTENDING APP SHARED VISIT CONTRIBUTION OF CARE
82 yo F PMHx noted including a fib on Xarelto., presents from [facility] for further evaluation s/p fall.  Pt tripped and fell, injured rt wrist and lip.  no LOC, able to ambulate afterwards.  Needs Tetanus. On exam pt in NAD AAO x 3, GCS 15, + swelling with bruising to lip, + small laceration to inner mucosa of lower lip, dentition intact, no midline vertebral tenderness, lungs equal ae b/l, abd soft nt nd, + swelling with bruising to rt wrist, dorsal hand, + tender wrist, + distal pulses, clavicles intact,

## 2024-03-14 NOTE — ED ADULT NURSE REASSESSMENT NOTE - NS ED NURSE REASSESS COMMENT FT1
pt transfer from [facility] fro subdural hematoma. pt A&Ox4. denies any CP, SOB, N/V/D

## 2024-03-14 NOTE — H&P ADULT - HISTORY OF PRESENT ILLNESS
Patient is a 82 y/o F with a past medical history significant for Atrial fibrillation on xarelto, HTN, and s/p cholecystectomy x 30 yrs prior who presents to the ED as a transfer from [facility] as trauma alert with chief complaint of R wrist pain s/p mechanical fall. Patient states that while outside her home, she tripped over curb, and landed onto R side. Patient states that she fell unto her R hand as she tried to brace her fall. Patient states that she hit her face during fall, without LOC, patient currently taking xarelto, last dose 3/13. Patient was able to stand and ambulate with assistance after fall. Patient ambulates without assistance at baseline. Patient originally seen at [facility], where her R wrist was splinted. CT imaging at [facility] site showed evidence of thin acute subdural hemorrhage along the anterior falx which prompted transfer to [facility]. Patient currently endorsing moderate R wrist pain which is worse with movement or palpation, denies changes in sensation in the extremity Patient denies HA, dizziness, weakness, changes in vision, nausea/vomiting, changes in sensation. Patient denies fevers, chills, chest pain, palpitations, shortness of breath, abd pain. Patient is a 84 y/o F with a past medical history significant for Atrial fibrillation on xarelto, HTN, and s/p cholecystectomy x 30 yrs prior who presents to the ED as a transfer from [facility] as trauma alert with chief complaint of R wrist pain s/p mechanical fall.  Trauma assessment in ED: ABCs intact , GCS 15 , AAOx3. Patient states that while outside her home, she tripped over curb, and landed onto R side. Patient states that she fell unto her R hand as she tried to brace her fall. Patient states that she hit her face during fall, without LOC, patient currently taking xarelto, last dose 3/13. Patient was able to stand and ambulate with assistance after fall. Patient ambulates without assistance at baseline. Patient originally seen at [facility], where her R wrist was splinted. CT imaging at [facility] site showed evidence of thin acute subdural hemorrhage along the anterior falx which prompted transfer to [facility]. Patient currently endorsing moderate R wrist pain which is worse with movement or palpation, denies changes in sensation in the extremity Patient denies HA, dizziness, weakness, changes in vision, nausea/vomiting, changes in sensation. Patient denies fevers, chills, chest pain, palpitations, shortness of breath, abd pain.

## 2024-03-15 ENCOUNTER — TRANSCRIPTION ENCOUNTER (OUTPATIENT)
Age: 84
End: 2024-03-15

## 2024-03-15 VITALS — DIASTOLIC BLOOD PRESSURE: 70 MMHG | HEART RATE: 98 BPM | SYSTOLIC BLOOD PRESSURE: 119 MMHG | TEMPERATURE: 97 F

## 2024-03-15 PROCEDURE — 99233 SBSQ HOSP IP/OBS HIGH 50: CPT

## 2024-03-15 PROCEDURE — 73110 X-RAY EXAM OF WRIST: CPT | Mod: 26,RT

## 2024-03-15 PROCEDURE — 70450 CT HEAD/BRAIN W/O DYE: CPT | Mod: 26

## 2024-03-15 PROCEDURE — 99238 HOSP IP/OBS DSCHRG MGMT 30/<: CPT

## 2024-03-15 RX ORDER — ACETAMINOPHEN 500 MG
2 TABLET ORAL
Qty: 0 | Refills: 0 | DISCHARGE
Start: 2024-03-15

## 2024-03-15 RX ORDER — RIVAROXABAN 15 MG-20MG
1 KIT ORAL
Refills: 0 | DISCHARGE

## 2024-03-15 RX ORDER — ENOXAPARIN SODIUM 100 MG/ML
40 INJECTION SUBCUTANEOUS EVERY 24 HOURS
Refills: 0 | Status: DISCONTINUED | OUTPATIENT
Start: 2024-03-15 | End: 2024-03-15

## 2024-03-15 RX ADMIN — GABAPENTIN 100 MILLIGRAM(S): 400 CAPSULE ORAL at 13:30

## 2024-03-15 RX ADMIN — SACUBITRIL AND VALSARTAN 1 TABLET(S): 24; 26 TABLET, FILM COATED ORAL at 17:22

## 2024-03-15 RX ADMIN — ENOXAPARIN SODIUM 40 MILLIGRAM(S): 100 INJECTION SUBCUTANEOUS at 17:22

## 2024-03-15 RX ADMIN — Medication 650 MILLIGRAM(S): at 07:39

## 2024-03-15 RX ADMIN — Medication 650 MILLIGRAM(S): at 17:23

## 2024-03-15 RX ADMIN — Medication 650 MILLIGRAM(S): at 17:22

## 2024-03-15 RX ADMIN — Medication 650 MILLIGRAM(S): at 06:40

## 2024-03-15 RX ADMIN — SODIUM CHLORIDE 110 MILLILITER(S): 9 INJECTION, SOLUTION INTRAVENOUS at 06:40

## 2024-03-15 RX ADMIN — SACUBITRIL AND VALSARTAN 1 TABLET(S): 24; 26 TABLET, FILM COATED ORAL at 06:40

## 2024-03-15 RX ADMIN — METHOCARBAMOL 500 MILLIGRAM(S): 500 TABLET, FILM COATED ORAL at 06:40

## 2024-03-15 RX ADMIN — Medication 650 MILLIGRAM(S): at 11:46

## 2024-03-15 RX ADMIN — Medication 20 MILLIGRAM(S): at 06:40

## 2024-03-15 RX ADMIN — METHOCARBAMOL 500 MILLIGRAM(S): 500 TABLET, FILM COATED ORAL at 11:33

## 2024-03-15 RX ADMIN — Medication 650 MILLIGRAM(S): at 11:33

## 2024-03-15 RX ADMIN — GABAPENTIN 100 MILLIGRAM(S): 400 CAPSULE ORAL at 06:40

## 2024-03-15 RX ADMIN — Medication 25 MILLIGRAM(S): at 06:40

## 2024-03-15 RX ADMIN — METHOCARBAMOL 500 MILLIGRAM(S): 500 TABLET, FILM COATED ORAL at 17:21

## 2024-03-15 NOTE — DISCHARGE NOTE PROVIDER - PROVIDER TOKENS
PROVIDER:[TOKEN:[[id number]],FOLLOWUP:[1 week]],PROVIDER:[TOKEN:[[id number]],FOLLOWUP:[2 weeks]]

## 2024-03-15 NOTE — DISCHARGE NOTE PROVIDER - NSDCCAREPROVSEEN_GEN_ALL_CORE_FT
Assessment:  82 y/o female, S/P Fall.  Right Distal Radius and Ulna Fracture.  S/P Closed reduction.  Traumatic SDH.    PLAN:  Patient remained neurologically stable.  Repeat Ct head - stable SDH.  Ok for discharge

## 2024-03-15 NOTE — DISCHARGE NOTE PROVIDER - EXTENDED VTE YES NO FOR MLM ENOXAPARIN
Lon Del Rosario [phone]  70 YOUR PATIENT IS HURTING, NEEDS TO SPEAK TO YOU Paged at number :  PAGE: [phone] at :  ZMW- 21:11 ,

## 2024-03-15 NOTE — CONSULT NOTE ADULT - ASSESSMENT
IMPRESSION: Rehab of multitrauma / s/p fall with traumatic SDH, R ulnar fx, facial contusion / Atrial fibrillation on xarelto, HTN, and s/p cholecystectomy     PRECAUTIONS: [   ] Cardiac  [   ] Respiratory  [   ] Seizures [   ] Contact Isolation  [   ] Droplet Isolation  [   ] Other    Weight Bearing Status: NWB R wrist     RECOMMENDATION:    Out of Bed to Chair     DVT/Decubiti Prophylaxis    REHAB PLAN:     [  x  ] Bedside P/T 3-5 times a week   [ x   ]   Bedside O/T  2-3 times a week             [    ] Speech Therapy               [    ]  No Rehab Therapy Indicated   Conditioning/ROM                                    ADL  Bed Mobility                                               Conditioning/ROM  Transfers                                                     Bed Mobility  Sitting /Standing Balance                         Transfers                                        Gait Training                                               Sitting/Standing Balance  Stair Training [   ]Applicable                    Home equipment Eval                                                                        Splinting  [   ] Only      GOALS:   ADL   [ x   ]   Independent                    Transfers  [ x   ] Independent                          Ambulation  [   x ] Independent     [  x   ] With device                            [    ]  CG                                                         [    ]  CG                                                                  [    ] CG                            [    ] Min A                                                   [    ] Min A                                                              [    ] Min  A          DISCHARGE PLAN:   [    ]  Good candidate for Intensive Rehabilitation/Hospital based                                             Will tolerate 3hrs Intensive Rehab Daily                                       [ x    ]  Short Term Rehab in Skilled Nursing Facility                             vs          [   x  ]  Home with Outpatient or VN services                                         [     ]  Possible Candidate for Intensive Hospital based Rehab

## 2024-03-15 NOTE — DISCHARGE NOTE PROVIDER - CARE PROVIDER_API CALL
Ese Merrill  Neurosurgery  [address], [address]  [place] [address]  Phone: [phone]  Fax: [phone]  Follow Up Time: 1 week    Maynor Rivera  Orthopaedic Surgery  [address] [place] [id number]  Phone: [phone]  Fax: [phone]  Follow Up Time: 2 weeks

## 2024-03-15 NOTE — PHYSICAL THERAPY INITIAL EVALUATION ADULT - GAIT DEVIATIONS NOTED, PT EVAL
decreased adri/decreased velocity of limb motion/decreased step length/decreased stride length/decreased weight-shifting ability

## 2024-03-15 NOTE — DISCHARGE NOTE NURSING/CASE MANAGEMENT/SOCIAL WORK - NSDCPEFALRISK_GEN_ALL_CORE
Awake For information on Fall & Injury Prevention, visit: [email or web address] OR  [email or web address] OR  https://www.cdc.gov/steadi/patient.html

## 2024-03-15 NOTE — DISCHARGE NOTE PROVIDER - CARE PROVIDERS DIRECT ADDRESSES
[email or web address] [email or web address]

## 2024-03-15 NOTE — PHYSICAL THERAPY INITIAL EVALUATION ADULT - GAIT TRAINING, PT EVAL
Increase ambulation using a cane or no assistive device X 100 feet  with  supervision  assist by d/c.

## 2024-03-15 NOTE — CONSULT NOTE ADULT - SUBJECTIVE AND OBJECTIVE BOX
HPI:  Patient is a 82 y/o F with a past medical history significant for Atrial fibrillation on xarelto, HTN, and s/p cholecystectomy x 30 yrs prior who presents to the ED as a transfer from [facility] as trauma alert with chief complaint of R wrist pain s/p mechanical fall.  Trauma assessment in ED: ABCs intact , GCS 15 , AAOx3. Patient states that while outside her home, she tripped over curb, and landed onto R side. Patient states that she fell unto her R hand as she tried to brace her fall. Patient states that she hit her face during fall, without LOC, patient currently taking xarelto, last dose 3/13. Patient was able to stand and ambulate with assistance after fall. Patient ambulates without assistance at baseline. Patient originally seen at [facility], where her R wrist was splinted. CT imaging at [facility] site showed evidence of thin acute subdural hemorrhage along the anterior falx which prompted transfer to [facility]. Patient currently endorsing moderate R wrist pain which is worse with movement or palpation, denies changes in sensation in the extremity Patient denies HA, dizziness, weakness, changes in vision, nausea/vomiting, changes in sensation. Patient denies fevers, chills, chest pain, palpitations, shortness of breath, abd pain.    On imaging studies, patient found to have thin acute subdural hemorrhage along the anterior falx as well as distal R ulnar fracture s/p ED splinting. NWB right wrist       PAST MEDICAL & SURGICAL HISTORY:  HTN (hypertension)      Atrial fibrillation      S/P cholecystectomy          Hospital Course:    TODAY'S SUBJECTIVE & REVIEW OF SYMPTOMS:     Constitutional WNL   Cardio WNL   Resp WNL   GI WNL  Heme WNL  Endo WNL  Skin WNL  MSK right wrist pain   Neuro WNL  Cognitive WNL  Psych WNL      MEDICATIONS  (STANDING):  acetaminophen     Tablet .. 650 milliGRAM(s) Oral every 6 hours  furosemide    Tablet 20 milliGRAM(s) Oral daily  gabapentin 100 milliGRAM(s) Oral three times a day  lactated ringers. 1000 milliLiter(s) (110 mL/Hr) IV Continuous <Continuous>  methocarbamol 500 milliGRAM(s) Oral four times a day  metoprolol succinate ER 25 milliGRAM(s) Oral daily  sacubitril 24 mG/valsartan 26 mG 1 Tablet(s) Oral two times a day    MEDICATIONS  (PRN):      FAMILY HISTORY:      Allergies    No Known Allergies    Intolerances        SOCIAL HISTORY:    [  ] Etoh  [  ] Smoking  [  ] Substance abuse     Home Environment:  [   ] Home Alone  [ x  ] Lives with Family  [   ] Home Health Aid    Dwelling:  [   ] Apartment  [ x  ] Private House  [   ] Adult Home  [   ] Skilled Nursing Facility      [   ] Short Term  [   ] Long Term  [  x ] Stairs       Elevator [   ]    FUNCTIONAL STATUS PTA: (Check all that apply)  Ambulation: [ x   ]Independent    [   ] Dependent     [   ] Non-Ambulatory  Assistive Device: [   ] SA Cane  [   ]  Q Cane  [   ] Walker  [   ]  Wheelchair  ADL : [x   ] Independent  [    ]  Dependent       Vital Signs Last 24 Hrs  T(C): 35.6 (15 Mar 2024 07:19), Max: 36.7 (14 Mar 2024 18:53)  T(F): 96.1 (15 Mar 2024 07:19), Max: 98 (14 Mar 2024 18:53)  HR: 73 (15 Mar 2024 07:19) (73 - 91)  BP: 114/72 (15 Mar 2024 07:19) (105/74 - 134/86)  BP(mean): --  RR: 18 (15 Mar 2024 04:00) (17 - 18)  SpO2: 95% (15 Mar 2024 04:00) (95% - 99%)    Parameters below as of 15 Mar 2024 04:00  Patient On (Oxygen Delivery Method): room air          PHYSICAL EXAM: Awake & Alert  GENERAL: NAD  HEAD:  Normocephalic, facial contusion   CHEST/LUNG: Clear   HEART: S1S2+  ABDOMEN: Soft, Nontender  EXTREMITIES:  no calf tenderness, right wrist in splint     NERVOUS SYSTEM:  Cranial Nerves 2-12 intact [   ] Abnormal  [   ]  ROM: WFL all extremities [   ]  Abnormal [ x  ]limited RUE   Motor Strength: WFL all extremities  [   ]  Abnormal [x   ]limited RUE  Sensation: intact to light touch [ x  ] Abnormal [   ]    FUNCTIONAL STATUS:  Bed Mobility: Independent [   ]  Supervision [ x  ]  Needs Assistance [   ]  N/A [   ]  Transfers: Independent [   ]  Supervision [ x  ]  Needs Assistance [   ]  N/A [   ]   Ambulation: Independent [   ]  Supervision [   ]  Needs Assistance [  x ]  N/A [   ]  ADL: Independent [   ] Requires Assistance [   ] N/A [   ]      LABS:                        12.9   6.92  )-----------( 136      ( 14 Mar 2024 19:33 )             38.6     03-14    137  |  99  |  20  ----------------------------<  115<H>  4.1   |  26  |  0.7    Ca    9.7      14 Mar 2024 19:33    TPro  7.2  /  Alb  3.9  /  TBili  2.6<H>  /  DBili  x   /  AST  78<H>  /  ALT  52<H>  /  AlkPhos  135<H>  03-14    PT/INR - ( 14 Mar 2024 19:33 )   PT: 29.20 sec;   INR: 2.54 ratio         PTT - ( 14 Mar 2024 19:33 )  PTT:39.6 sec  Urinalysis Basic - ( 14 Mar 2024 19:33 )    Color: x / Appearance: x / SG: x / pH: x  Gluc: 115 mg/dL / Ketone: x  / Bili: x / Urobili: x   Blood: x / Protein: x / Nitrite: x   Leuk Esterase: x / RBC: x / WBC x   Sq Epi: x / Non Sq Epi: x / Bacteria: x        RADIOLOGY & ADDITIONAL STUDIES:

## 2024-03-15 NOTE — PATIENT PROFILE ADULT - FALL HARM RISK - HARM RISK INTERVENTIONS

## 2024-03-15 NOTE — CONSULT NOTE ADULT - ASSESSMENT
Patient is a 82 y/o F with a past medical history significant for Atrial fibrillation on xarelto, HTN, and s/p cholecystectomy x 30 yrs prior who presents to the ED as a transfer from [facility] as trauma alert with chief complaint of R wrist pain s/p mechanical fall.     # Mechanical fall  # Acute SDH   # Right distal radius fracture  - CT Head No Cont (03.15.24 @ 00:57) >Since 3/14/2024, the small anterior interhemispheric subdural hematoma   has improved  - xarelto held  - neurosurgery eval:  No acute neurosurgical intervention warranted at this time  - Control SBP below 160  - ortho eval: R distal radius fx, now s/p closed reduction and splinting.  NWB RUE in splint  - pain control  - evaluated by PT  - management as per primary team    # Chronic afib, rate controlled  # Chronic systolic CHF , no echo in records  - xarelto held sec to SDH  - c/w metoprolol  - pt on lasix, entresto  - dc IV fluids  0- 2 D echo    # Transaminitis  - monitor LFT    # Full code    # Pendin D echo, monitor LFT

## 2024-03-15 NOTE — DISCHARGE NOTE PROVIDER - HOSPITAL COURSE
82 y/o F with a past medical history significant for Atrial fibrillation on xarelto, HTN, and s/p cholecystectomy x 30 yrs prior who presents to the ED as a transfer from [facility] as trauma alert with chief complaint of R wrist pain s/p mechanical fall.  Trauma assessment in ED: ABCs intact , GCS 15 , AAOx3. Patient states that while outside her home, she tripped over curb, and landed onto R side. Patient states that she fell unto her R hand as she tried to brace her fall. Patient states that she hit her face during fall, without LOC, patient currently taking xarelto, last dose 3/13. Patient was able to stand and ambulate with assistance after fall. Patient ambulates without assistance at baseline. Patient originally seen at [facility], where her R wrist was splinted. CT imaging at [facility] site showed evidence of thin acute subdural hemorrhage along the anterior falx which prompted transfer to [facility]. Patient currently endorsing moderate R wrist pain which is worse with movement or palpation, denies changes in sensation in the extremity Patient denies HA, dizziness, weakness, changes in vision, nausea/vomiting, changes in sensation. Patient denies fevers, chills, chest pain, palpitations, shortness of breath, abd pain. Trauma work up revealed Left distal radius and ulna fx, splinted by ED, evaluated by orthopedics. Small SDH cleared for Q4 neurochecks by neurosurgery- had stable head CT. The patient was admitted, worked with PT. Lives at home alone, but her daughter lives in the apartment above her and will care for her. She is medically cleared for discharge HD2. Will follow up outpatient with neurosurgery and orthopedics.

## 2024-03-15 NOTE — CONSULT NOTE ADULT - SUBJECTIVE AND OBJECTIVE BOX
Patient is a 83y old  Female who presents with a chief complaint of s/p fall (14 Mar 2024 21:55)    Patient was seen and examined.  Denies any complaints.  All systems reviewed    PAST MEDICAL & SURGICAL HISTORY:  HTN (hypertension)  Atrial fibrillation  S/P cholecystectomy    Allergies  No Known Allergies    MEDICATIONS  (STANDING):  acetaminophen     Tablet .. 650 milliGRAM(s) Oral every 6 hours  furosemide    Tablet 20 milliGRAM(s) Oral daily  gabapentin 100 milliGRAM(s) Oral three times a day  lactated ringers. 1000 milliLiter(s) (110 mL/Hr) IV Continuous <Continuous>  methocarbamol 500 milliGRAM(s) Oral four times a day  metoprolol succinate ER 25 milliGRAM(s) Oral daily  sacubitril 24 mG/valsartan 26 mG 1 Tablet(s) Oral two times a day    MEDICATIONS  (PRN):    Vital Signs Last 24 Hrs  T(C): 35.6  T(F): 96.1  HR: 73  BP: 114/72  BP(mean): --  RR: 18  SpO2: 95%    O/E:  Awake, alert, not in distress.  HEENT: EOMI. bruising around lower lip  Chest: clear.  CVS: SIS2 +, irregular no murmur.  P/A: Soft, BS+  CNS:  awake, alert  Ext: no edema feet.,  RUE in splint  All systems reviewed positive findings as above.                            12.9   6.92  )-----------( 136      ( 14 Mar 2024 19:33 )             38.6     03-14    137  |  99  |  20  ----------------------------<  115<H>  4.1   |  26  |  0.7    Ca    9.7      14 Mar 2024 19:33    TPro  7.2  /  Alb  3.9  /  TBili  2.6<H>  /  DBili  x   /  AST  78<H>  /  ALT  52<H>  /  AlkPhos  135<H>  03-14          PT/INR - ( 14 Mar 2024 19:33 )   PT: 29.20 sec;   INR: 2.54 ratio         PTT - ( 14 Mar 2024 19:33 )  PTT:39.6 sec  Urinalysis Basic - ( 14 Mar 2024 19:33 )    Color: x / Appearance: x / SG: x / pH: x  Gluc: 115 mg/dL / Ketone: x  / Bili: x / Urobili: x   Blood: x / Protein: x / Nitrite: x   Leuk Esterase: x / RBC: x / WBC x   Sq Epi: x / Non Sq Epi: x / Bacteria: x

## 2024-03-15 NOTE — DISCHARGE NOTE PROVIDER - NSDCMRMEDTOKEN_GEN_ALL_CORE_FT
acetaminophen 325 mg oral tablet: 2 tab(s) orally every 6 hours  Entresto 24 mg-26 mg oral tablet: 1 tab(s) orally 2 times a day  furosemide 20 mg oral tablet: 1 tab(s) orally once a day  metoprolol succinate 25 mg oral capsule, extended release: 1 cap(s) orally once a day

## 2024-03-15 NOTE — DISCHARGE NOTE NURSING/CASE MANAGEMENT/SOCIAL WORK - PATIENT PORTAL LINK FT
You can access the FollowMyHealth Patient Portal offered by [facility] by registering at the following website: [email or web address]. By joining [employer]’s FollowMyHealth portal, you will also be able to view your health information using other applications (apps) compatible with our system.

## 2024-03-15 NOTE — PATIENT PROFILE ADULT - SURGICAL SITE INCISION
[facility] AMBULATORY ENCOUNTER  ENT OUTPATIENT CONSULTATION    SUBJECTIVE:    Denver Benitez is a 39 year old male who presents on request of Dr. Oseguera for further evaluation and management of his nose.  The patient has had seasonal problems with epistaxis for the last few years.  It is worse in the winter.  It is on the left side.  Sometimes the patient will wake up with blood on his pillow.  His last bleed was 6 weeks ago.  In addition, the patient does have an area of irritation/scabbing along the floor of the nose on the left side but he wishes to have checked out.  Patient denies other exacerbating or relieving factors.  He denies other associated symptoms.    PAST HISTORIES:  I have reviewed the past medical history, family history, social history, medications and allergies listed in the medical record as obtained by my nursing staff and support staff and agree with their documentation.     REVIEW OF SYSTEMS:     Comprehensive ROS reviewed with patient.  Pertinent positives are listed in HPI.    PHYSICAL EXAM:    Vital Signs:   height is 5' 11\" (1.803 m) and weight is 116.1 kg. His pulse is 108 (abnormal). His oxygen saturation is 97%.   Constitutional:  Patient is a well-nourished, well-developed 39 year old male in no distress with fluent speech, strong voice, no stridor, and unlabored respirations.  Affect is calm and patient is alert.   Neurologic/Psych:  Alert, appropriate mood and affect.  Respiratory:  No accessory muscle use.  Face/Head:  Normocephalic.  No masses.  Face is strong and symmetric.  Light reflexes are symmetric.  Pupils equal, round.  Extraocular motions intact.  Irides normal.  Other cranial nerves III-XII grossly intact.      EARS:    Left auricle:  Normally-formed.  No lesions.  Left external canal:  No edema, erythema or exudate.  Left tympanic membrane:  Tympanic membrane translucent, mobile, without retraction or effusion.  Normal landmarks present.   Right auricle:  Normally-formed.   No lesions.  Right external canal:  No edema, erythema or exudate.  Right tympanic membrane:  Tympanic membrane translucent, mobile, without retraction or effusion.  Normal landmarks present.     NOSE:  External:  No lesion.  Septum:  No gross deformity.  In the left Kiesselbach's plexus region, there is a tiny daly of dried blood that is seen.  This is gently wiped off with a cotton tip applicator.  Underneath, there is no significant abnormality seen.  No crusting, area, or prominent vessels.  In the left inferior nasal vestibule, there is some mild vestibulitis seen.  Turbinates:  Inferior turbinates are normal bilaterally.  Mucosa:  No lesions visible.  No unusual discharge.     ORAL CAVITY:     Lips:  Mobile.  No lesions.  Teeth:  Dentition in good repair.  Gingivae:  Without lesions.  Tongue:  Anterior tongue mobile.  Symmetric.  Without lesions.   Floor of mouth:  Normal structures, no mucosal lesions.  Hard palate:  Symmetric without lesions.  Mucosa:  Moist, pink.  Without lesions or exudate.    OROPHARYNX:  Tongue base:  Symmetric.  Mobile.  Without lesion.  Tonsils:  No masses or lesions seen.  Soft palate:  Mobile.  Symmetric.  No lesion.  Normally-formed uvula.  Mucosa:  No exudate.  Moist.     NECK:  External:  No external visualized lesions or abnormalities.  Palpation:  Neck supple.  There is no crepitus.  Trachea midline.  No palpable thyroid masses or gross enlargement.  Major salivary glands normal to palpation. No masses or lymphadenopathy.      ASSESSMENT:    1. Epistaxis    2. Nasal vestibulitis    Epistaxis, now resolved.  Source was probably from the left Kiesselbach's plexus region, but no abnormality is currently seen.  Last bleed was 6 weeks ago.  The bleeding does tend to happen more often in the winter.  Mild nasal vestibulitis, left nasal cavity.      PLAN:    For the left nasal septum, I advised the patient to apply Vaseline gently with a Q-tip on a nightly basis.  For the left floor  of the nose, I advised the patient to apply Neosporin ointment 2 times a day for a couple weeks, then consider maintenance treatment with nightly Vaseline.  Return visit on a p.r.n. basis for any further problems, particularly if he starts developing epistaxis next winter despite use of the Vaseline.    Instructions provided as documented in the After Visit Summary.    The patient indicates understanding of the diagnosis and agrees with the plan of care.             no

## 2024-03-15 NOTE — CHART NOTE - NSCHARTNOTEFT_GEN_A_CORE
ORTHOPAEDIC SURGERY NOTE    Patient seen and examined at bedside this morning after ortho was consulted last night due to R distal radius fx. Patient was closed reduced and splinted in the ED. She is endorsing some pain in the R distal radius. Denies pain elsewhere. Denies numbness/tingling.    EXAM  RUE  Splint in place  Swelling of R hand/ digits  NVI    LUE  Skin intact  No swelling/warmth  NTTP   Painless ROM shoulder/elbow/wrist  NVI    BUE  Skin intact  No swelling/warmth  NTTP   Painless ROM hip/knee/ankle  NVI    A&P  83F w/ R distal radius fx, now s/p closed reduction and splinting. Discussed with patient that her fracture is currently amenable to non-operative treatment. Reviewed with patient that we will monitor her with serial XR to confirm that she is healing appropriately, in acceptable alignment.   - NWB RUE in splint  - pain control  - upon discharge please follow up in 1 week with Dr. Vance at [address] [facility]; please call to schedule an appointment: [phone]
Cleared for DVT ppx by neurosurgery, discussed with Neurosurgery Jody JOSE.    Estefany Ford, PA-C  [id number]

## 2024-03-15 NOTE — PHYSICAL THERAPY INITIAL EVALUATION ADULT - GENERAL OBSERVATIONS, REHAB EVAL
950-1030 am Chart reviewed. Pt. seen semirecline in bed, in No apparent distress , + IV lock , Prima fit device , right forearm splint and ace bandage, Pt. agreed to activity/therapy.

## 2024-03-15 NOTE — DISCHARGE NOTE NURSING/CASE MANAGEMENT/SOCIAL WORK - NSDCVIVACCINE_GEN_ALL_CORE_FT
Tdap; 14-Mar-2024 18:04; Harriett Chen (LEORA); [employer];   [id number]   (Exp. Date: 14-Dec-2025); IntraMuscular; Deltoid Right.; 0.5 milliLiter(s); VIS (VIS Published: 09-May-2013, VIS Presented: 14-Mar-2024);

## 2024-03-15 NOTE — PATIENT PROFILE ADULT - NSTOBACCONEVERSMOKERY/N_GEN_A
Celeste Kumar was seen and treated in our emergency department on 1/25/2021  Diagnosis:     Oral Pascual  may return to work on return date  She may return on this date: 01/28/2021         If you have any questions or concerns, please don't hesitate to call        Holly White DO    ______________________________           _______________          _______________  Hospital Representative                              Date                                Time No

## 2024-03-15 NOTE — CONSULT NOTE ADULT - SUBJECTIVE AND OBJECTIVE BOX
ORTHOPAEDIC SURGERY CONSULT NOTE    Reason for Consult: R distal radius fracture    HPI: 83yFemale RHD who presents as transfer from [facility] after mechanical fall. (+) HT, (+) A/C use. Reports she landed on her extended R wrist during fall. Workup at [facility] notable for R distal radius fracture and SAH. Patient reduced and splinted by ED. Ortho consulted to evaluate reduction and give recommendations.     Patient seen at bedside. She reports she was able to ambulate after fall. Denies pain elsewhere in RUE, LUE, BLE. No associated numbness/ tingling.     At baseline, she is relatively active. Ambulates without assistive device. Lives at home with daughter, 1 flight of stairs. Is very active in her senior center where she enjoys socializing. Independent in ADLs.     PAST MEDICAL & SURGICAL HISTORY:  HTN (hypertension)  Atrial fibrillation  S/P cholecystectomy    Allergies: No Known Allergies    Medications: acetaminophen     Tablet .. 650 milliGRAM(s) Oral every 6 hours  furosemide    Tablet 20 milliGRAM(s) Oral daily  gabapentin 100 milliGRAM(s) Oral three times a day  lactated ringers. 1000 milliLiter(s) IV Continuous <Continuous>  methocarbamol 500 milliGRAM(s) Oral four times a day  metoprolol succinate ER 25 milliGRAM(s) Oral daily  sacubitril 24 mG/valsartan 26 mG 1 Tablet(s) Oral two times a day      PHYSICAL EXAM:  Vital Signs Last 24 Hrs  T(C): 35.9 (14 Mar 2024 21:15), Max: 36.7 (14 Mar 2024 18:53)  T(F): 96.7 (14 Mar 2024 21:15), Max: 98 (14 Mar 2024 18:53)  HR: 86 (14 Mar 2024 21:15) (84 - 91)  BP: 128/72 (14 Mar 2024 21:15) (105/74 - 134/86)  BP(mean): --  RR: 17 (14 Mar 2024 21:07) (17 - 18)  SpO2: 98% (14 Mar 2024 21:15) (96% - 99%)    Parameters below as of 14 Mar 2024 21:15  Patient On (Oxygen Delivery Method): room air    Physical exam:  Awake, alert  Non-labored breathing    RUE  splint taken down  Skin intact  (+) swelling, ecchymosis, deformity to R wrist  ROM limited wrist 2/2 pain. Grossly intact shoulder, elbow, fingers  TTP wrist  NTTP fingers, elbow, humerus, shoulder, clavicle   Compartments soft and compressible, no pain w/ passive stretch of digits  SILT Ax/LABCN/R/M/U  AIN/PIN/U intact   Radial pulse 2+, cap refill <2    LUE:  Skin intact  No swelling/erythema/ecchymosis noted  No TTP, stepoff, deformity   ROM grossly intact in all joints, all planes  Motor/ sensation grossly intact  WWP distally    BLE  Skin intact  No swelling/erythema/ecchymosis noted  No TTP, stepoff, deformity   Negative log roll, axial load  ROM grossly intact in all joints, all planes  Motor/ sensation grossly intact  WWP distally      Labs:                        12.9   6.92  )-----------( 136      ( 14 Mar 2024 19:33 )             38.6     03-14    137  |  99  |  20  ----------------------------<  115<H>  4.1   |  26  |  0.7    Ca    9.7      14 Mar 2024 19:33    TPro  7.2  /  Alb  3.9  /  TBili  2.6<H>  /  DBili  x   /  AST  78<H>  /  ALT  52<H>  /  AlkPhos  135<H>  03-14    PT/INR - ( 14 Mar 2024 19:33 )   PT: 29.20 sec;   INR: 2.54 ratio         PTT - ( 14 Mar 2024 19:33 )  PTT:39.6 sec    Imaging:   -radiographs demonstrate shortened, displaced distal radius fracture with associated ulnar styloid    A/P: 83y Female RHD with R distal radius after ground level fall. Admitted for monitoring with concomitant SAH. Discussed with patient nature of osseous injury. In its current alignment in splint, it is likely to lose reduction and displace in follow up, likely to result in functional limitations such as ROM and  strength. RBA of re-reduction discussed, and patient amendable to procedure. Plan at this time will be to trial non-operative management with splint/ cast immobilization.     Procedure:   -hematoma block and digital block with 2% lidocaine  -TOBY hung in traction  -closed reduced and splinted in reverse sugar tong  -post reduction XRays demonstrated improved alignment  -after procedure, <2 sec cap refill, AIN/PIN/U motor intact, M/R/U sensation intact    Plan:  - No acute Orthopaedic intervention. Non-operative management  - Pain control  - Activity: NWB RUE  - Keep splint C/D/I. splint care explained.  - Extremity icing/elevation. Explained to patient/ family it is expected for her R hand to swell and become ecchymotic over next 1-2 weeks. Return precautions discussed  - Patient instructed to return to ED if any worsening pain, numbness, tingling, fevers, chills or any other concerning symptoms.  - F/U with Dr. Vance in 1 week. Please call [phone].    - Bed rest  - Pain control per primary team  - DVT PPx per primary, to be held on morning of procedure  - Patient requires Internal Medicine pre-op clearance / risk stratification / medical optimization  - Pre-Op CBC, CMP/BMP, PT/PTT/INR, Type & Screen x2, CXR, EKG, COVID test  - Trend Hgb  - 2u RBC on hold for surgery  - NPO for surgery tomorrow *** ORTHOPAEDIC SURGERY CONSULT NOTE    Reason for Consult: R distal radius fracture    HPI: 83yFemale RHD who presents as transfer from [facility] after mechanical fall. (+) HT, (+) A/C use. Reports she landed on her extended R wrist during fall. Workup at [facility] notable for R distal radius fracture and SAH. Patient reduced and splinted by ED. Ortho consulted to evaluate reduction and give recommendations.     Patient seen at bedside. She reports she was able to ambulate after fall. Denies pain elsewhere in RUE, LUE, BLE. No associated numbness/ tingling.     At baseline, she is relatively active. Ambulates without assistive device. Lives at home with daughter, 1 flight of stairs. Is very active in her senior center where she enjoys socializing. Independent in ADLs.     PAST MEDICAL & SURGICAL HISTORY:  HTN (hypertension)  Atrial fibrillation  S/P cholecystectomy    Allergies: No Known Allergies    Medications: acetaminophen     Tablet .. 650 milliGRAM(s) Oral every 6 hours  furosemide    Tablet 20 milliGRAM(s) Oral daily  gabapentin 100 milliGRAM(s) Oral three times a day  lactated ringers. 1000 milliLiter(s) IV Continuous <Continuous>  methocarbamol 500 milliGRAM(s) Oral four times a day  metoprolol succinate ER 25 milliGRAM(s) Oral daily  sacubitril 24 mG/valsartan 26 mG 1 Tablet(s) Oral two times a day      PHYSICAL EXAM:  Vital Signs Last 24 Hrs  T(C): 35.9 (14 Mar 2024 21:15), Max: 36.7 (14 Mar 2024 18:53)  T(F): 96.7 (14 Mar 2024 21:15), Max: 98 (14 Mar 2024 18:53)  HR: 86 (14 Mar 2024 21:15) (84 - 91)  BP: 128/72 (14 Mar 2024 21:15) (105/74 - 134/86)  BP(mean): --  RR: 17 (14 Mar 2024 21:07) (17 - 18)  SpO2: 98% (14 Mar 2024 21:15) (96% - 99%)    Parameters below as of 14 Mar 2024 21:15  Patient On (Oxygen Delivery Method): room air    Physical exam:  Awake, alert  Non-labored breathing    RUE  splint taken down  Skin intact  (+) swelling, ecchymosis, deformity to R wrist  ROM limited wrist 2/2 pain. Grossly intact shoulder, elbow, fingers  TTP wrist  NTTP fingers, elbow, humerus, shoulder, clavicle   Compartments soft and compressible, no pain w/ passive stretch of digits  SILT Ax/LABCN/R/M/U  AIN/PIN/U intact   Radial pulse 2+, cap refill <2    LUE:  Skin intact  No swelling/erythema/ecchymosis noted  No TTP, stepoff, deformity   ROM grossly intact in all joints, all planes  Motor/ sensation grossly intact  WWP distally    BLE  Skin intact  No swelling/erythema/ecchymosis noted  No TTP, stepoff, deformity   Negative log roll, axial load  ROM grossly intact in all joints, all planes  Motor/ sensation grossly intact  WWP distally      Labs:                        12.9   6.92  )-----------( 136      ( 14 Mar 2024 19:33 )             38.6     03-14    137  |  99  |  20  ----------------------------<  115<H>  4.1   |  26  |  0.7    Ca    9.7      14 Mar 2024 19:33    TPro  7.2  /  Alb  3.9  /  TBili  2.6<H>  /  DBili  x   /  AST  78<H>  /  ALT  52<H>  /  AlkPhos  135<H>  03-14    PT/INR - ( 14 Mar 2024 19:33 )   PT: 29.20 sec;   INR: 2.54 ratio         PTT - ( 14 Mar 2024 19:33 )  PTT:39.6 sec    Imaging:   -radiographs demonstrate dorsally angulated, displaced distal radius fracture with associated ulnar styloid    A/P: 83y Female RHD with R distal radius after ground level fall. Admitted for monitoring with concomitant SAH. Discussed with patient nature of osseous injury. In its current alignment in splint, it is likely to lose reduction and displace in follow up, likely to result in functional limitations such as ROM and  strength. RBA of re-reduction discussed, and patient amendable to procedure. Plan at this time will be to trial non-operative management with splint/ cast immobilization.     Procedure:   -hematoma block and digital block with 2% lidocaine  -TOBY hung in traction  -closed reduced and splinted in reverse sugar tong  -post reduction XRays demonstrated improved alignment  -after procedure, <2 sec cap refill, AIN/PIN/U motor intact, M/R/U sensation intact    Plan:  - No acute Orthopaedic intervention. Non-operative management  - Pain control  - Activity: NWB RUE  - Keep splint C/D/I. splint care explained.  - Extremity icing/elevation. Explained to patient/ family it is expected for her R hand to swell and become ecchymotic over next 1-2 weeks. Return precautions discussed  - Patient instructed to return to ED if any worsening pain, numbness, tingling, fevers, chills or any other concerning symptoms.  - F/U with Dr. Vance in 1 week. Please call [phone].    - Bed rest  - Pain control per primary team  - DVT PPx per primary, to be held on morning of procedure  - Patient requires Internal Medicine pre-op clearance / risk stratification / medical optimization  - Pre-Op CBC, CMP/BMP, PT/PTT/INR, Type & Screen x2, CXR, EKG, COVID test  - Trend Hgb  - 2u RBC on hold for surgery  - NPO for surgery tomorrow *** ORTHOPAEDIC SURGERY CONSULT NOTE    Reason for Consult: R distal radius fracture    HPI: 83yFemale RHD who presents as transfer from [facility] after mechanical fall. (+) HT, (+) A/C use. Reports she landed on her extended R wrist during fall. Workup at [facility] notable for R distal radius fracture and SAH. Patient reduced and splinted by ED. Ortho consulted to evaluate reduction and give recommendations.     Patient seen at bedside. She reports she was able to ambulate after fall. Denies pain elsewhere in RUE, LUE, BLE. No associated numbness/ tingling.     At baseline, she is relatively active. Ambulates without assistive device. Lives at home with daughter, 1 flight of stairs. Is very active in her senior center where she enjoys socializing. Independent in ADLs.     PAST MEDICAL & SURGICAL HISTORY:  HTN (hypertension)  Atrial fibrillation  S/P cholecystectomy    Allergies: No Known Allergies    Medications: acetaminophen     Tablet .. 650 milliGRAM(s) Oral every 6 hours  furosemide    Tablet 20 milliGRAM(s) Oral daily  gabapentin 100 milliGRAM(s) Oral three times a day  lactated ringers. 1000 milliLiter(s) IV Continuous <Continuous>  methocarbamol 500 milliGRAM(s) Oral four times a day  metoprolol succinate ER 25 milliGRAM(s) Oral daily  sacubitril 24 mG/valsartan 26 mG 1 Tablet(s) Oral two times a day      PHYSICAL EXAM:  Vital Signs Last 24 Hrs  T(C): 35.9 (14 Mar 2024 21:15), Max: 36.7 (14 Mar 2024 18:53)  T(F): 96.7 (14 Mar 2024 21:15), Max: 98 (14 Mar 2024 18:53)  HR: 86 (14 Mar 2024 21:15) (84 - 91)  BP: 128/72 (14 Mar 2024 21:15) (105/74 - 134/86)  BP(mean): --  RR: 17 (14 Mar 2024 21:07) (17 - 18)  SpO2: 98% (14 Mar 2024 21:15) (96% - 99%)    Parameters below as of 14 Mar 2024 21:15  Patient On (Oxygen Delivery Method): room air    Physical exam:  Awake, alert  Non-labored breathing    RUE  splint taken down  Skin intact  (+) swelling, ecchymosis, deformity to R wrist  ROM limited wrist 2/2 pain. Grossly intact shoulder, elbow, fingers  TTP wrist  NTTP fingers, elbow, humerus, shoulder, clavicle   Compartments soft and compressible, no pain w/ passive stretch of digits  SILT Ax/LABCN/R/M/U  AIN/PIN/U intact   Radial pulse 2+, cap refill <2    LUE:  Skin intact  No swelling/erythema/ecchymosis noted  No TTP, stepoff, deformity   ROM grossly intact in all joints, all planes  Motor/ sensation grossly intact  WWP distally    BLE  Skin intact  No swelling/erythema/ecchymosis noted  No TTP, stepoff, deformity   Negative log roll, axial load  ROM grossly intact in all joints, all planes  Motor/ sensation grossly intact  WWP distally      Labs:                        12.9   6.92  )-----------( 136      ( 14 Mar 2024 19:33 )             38.6     03-14    137  |  99  |  20  ----------------------------<  115<H>  4.1   |  26  |  0.7    Ca    9.7      14 Mar 2024 19:33    TPro  7.2  /  Alb  3.9  /  TBili  2.6<H>  /  DBili  x   /  AST  78<H>  /  ALT  52<H>  /  AlkPhos  135<H>  03-14    PT/INR - ( 14 Mar 2024 19:33 )   PT: 29.20 sec;   INR: 2.54 ratio         PTT - ( 14 Mar 2024 19:33 )  PTT:39.6 sec    Imaging:   -radiographs demonstrate dorsally angulated, displaced distal radius fracture with associated ulnar styloid    A/P: 83y Female RHD with R distal radius after ground level fall. Admitted for monitoring with concomitant SAH. Discussed with patient nature of osseous injury. In its current alignment in splint, it is likely to lose reduction and displace in follow up, likely to result in functional limitations such as ROM and  strength. RBA of re-reduction discussed, and patient amendable to procedure. Plan at this time will be to trial non-operative management with splint/ cast immobilization.     Procedure:   -hematoma block and digital block with 2% lidocaine  -TOBY hung in traction  -closed reduced and splinted in reverse sugar tong  -post reduction XRays demonstrated improved alignment  -after procedure, <2 sec cap refill, AIN/PIN/U motor intact, M/R/U sensation intact    Plan:  - No acute Orthopaedic intervention. Non-operative management  - Pain control  - Activity: NWB RUE  - Keep splint C/D/I. splint care explained.  - Extremity icing/elevation. Explained to patient/ family it is expected for her R hand to swell and become ecchymotic over next 1-2 weeks. Return precautions discussed  - Patient instructed to return to ED if any worsening pain, numbness, tingling, fevers, chills or any other concerning symptoms.  - F/U with Dr. Vance in 1 week. Please call [phone].

## 2024-03-15 NOTE — PATIENT PROFILE ADULT - HOW PATIENT ADDRESSED, PROFILE
9/5/2017    Ken Mar MD  [facility]   [address] [place] [address]    RE: Monik Santiago       Dear Colleague,    I had the pleasure of seeing Monik Santiago in the [facility].    REASON FOR CLINIC VISIT:  Recent hospitalization.      Ms. Santiago is a very pleasant 76-year-old female who I saw about a month ago.  The patient has history of CAD with history of LAD PCI in 2012 and she underwent repeat coronary angiogram in 2014 and was found to have patent LAD stent and only mild non-flow-limiting coronary artery disease elsewhere.  It was felt her symptoms were likely from hiatal hernia for which she had surgery and since then her symptoms resolved.  Last month during the clinic visit, she complained of some dizziness and palpitation and her blood pressure was also elevated.  I recommended increasing the dose of lisinopril and to follow up with her primary care physician and also recommended doing an event monitor for a week.  About 10 days later, the patient was admitted in the hospital because of chest discomfort.  She was ruled out for acute coronary syndrome with EKG showing no acute ST-T changes and serial troponins negative.  She underwent echocardiogram that showed normal LV function, normal RV systolic function, no significant valvular disease.  Her blood pressure was found to be elevated and lisinopril dose was increased.  Subsequently, she was seen by Dr. Mar and her blood pressure was still elevated and amlodipine was added.  Today, she is coming in followup accompanied by her daughter.  The patient tells me that her blood pressure is now much better.  In fact, today in the clinic, her blood pressure is 126/78.  She tells me that she does not recall exactly what led to her admission, but she felt some chest tightness at that time.  This was not exertion-related.  She walks her dog every day and no chest discomfort with physical activity.  In  fact, she tells me even now she can get chest discomfort which happened when she is using her upper extremity, for example washing dishes or if she raises her arms while changing clothes, and the moment she brings her arm down the discomfort goes away.  No shortness of breath with physical activity.  I do not have an official report for event monitor, but I do have a strip available which shows patient reported some lightheadedness and the rhythm was sinus at that time.  She is additionally on baby aspirin, Lipitor 40 mg daily.  LDL is well controlled at 45.  Presently she is on metoprolol tartrate 25 mg b.i.d., lisinopril 20 mg b.i.d., amlodipine 5 mg daily and Lasix 10 mg daily.      PHYSICAL EXAMINATION:   VITAL SIGNS:  Blood pressure 126/78, heart rate 62 and regular, weight 147 pounds, BMI 26.   GENERAL:  The patient appears pleasant, comfortable.   NECK:  Normal JVP, no bruit.   CARDIOVASCULAR SYSTEM:  S1, S2 normal.  No murmur, rub or gallop.   RESPIRATORY SYSTEM:  Clear to auscultation bilaterally.   ABDOMEN:  Soft, nontender.   EXTREMITIES:  No pitting pedal edema.   NEUROLOGICAL:  Alert, oriented x3.   PSYCHIATRIC:  Normal affect.   SKIN:  No pallor or icterus.     Outpatient Encounter Prescriptions as of 9/5/2017   Medication Sig Dispense Refill     amLODIPine (NORVASC) 5 MG tablet Take 1 tablet (5 mg) by mouth daily 30 tablet 3     [DISCONTINUED] lisinopril (PRINIVIL/ZESTRIL) 20 MG tablet Take 1 tablet (20 mg) by mouth 2 times daily 60 tablet 0     Multiple Vitamins-Minerals (MULTIVITAMIN ADULT) CHEW Take 2 chew tab by mouth daily       loperamide (IMODIUM) 2 MG capsule Take 2 mg by mouth 4 times daily as needed for diarrhea       Probiotic Product (PROBIOTIC DAILY PO) Take 1 capsule by mouth daily       [DISCONTINUED] furosemide (LASIX) 20 MG tablet TAKE HALF A TABLET BY MOUTH ONCE DAILY 45 tablet 2     DULoxetine (CYMBALTA) 20 MG EC capsule Take 1 capsule (20 mg) by mouth 2 times daily 180 capsule 3      metoprolol (LOPRESSOR) 25 MG tablet Take 1 tablet (25 mg) by mouth 2 times daily 180 tablet 3     atorvastatin (LIPITOR) 40 MG tablet Take 1 tablet (40 mg) by mouth daily 90 tablet 3     ASPIRIN PO Take 81 mg by mouth daily       ACETAMINOPHEN PO Take 500-1,000 mg by mouth every 8 hours as needed for pain       Lysine 500 MG TABS Take 1 tablet by mouth daily as needed        NITROGLYCERIN SL Place 0.4 mg under the tongue       No facility-administered encounter medications on file as of 9/5/2017.       IMPRESSION AND PLAN:  A very delightful 76-year-old female with history of CAD, hypertension, dyslipidemia, history of hiatal hernia, who was admitted with chest discomfort and was ruled out for acute coronary syndrome.  Echocardiogram showed normal LV function.  Overall, her present chest discomfort appears quite atypical to almost musculoskeletal in nature.  However, the patient is not sure whether she had a different kind of chest discomfort last month, requiring hospitalization.  After a long discussion, we decided to do a nuclear stress test.  We will try exercise nuclear stress test off beta blocker, but if she is not able to achieve target heart rate we can convert this into Lexiscan.  I cautioned patient not to consume any caffeine 12 hours before the stress in case we need to convert into Lexiscan.  If she continues to have musculoskeletal chest discomfort, I recommend she can follow up with Dr. Mar. Until that time, she can use Tylenol on an as needed basis.  I am setting up a followup in about a month to go over the results of the stress test and also the blood pressure control.         Again, thank you for allowing me to participate in the care of your patient.      Sincerely,    Yung Angel MD     [facility]       Karime Tan

## 2024-03-15 NOTE — PHYSICAL THERAPY INITIAL EVALUATION ADULT - IMPAIRMENTS CONTRIBUTING TO GAIT DEVIATIONS, PT EVAL
cognition/decreased strength Sski Pregnancy And Lactation Text: This medication is Pregnancy Category D and isn't considered safe during pregnancy. It is excreted in breast milk.

## 2024-03-15 NOTE — DISCHARGE NOTE PROVIDER - NSDCCPCAREPLAN_GEN_ALL_CORE_FT
PRINCIPAL DISCHARGE DIAGNOSIS  Diagnosis: Traumatic subdural hematoma without loss of consciousness  Assessment and Plan of Treatment: No neurosurgery intervention at this time.  Hold xarelto for 1 week.  Follow up with Dr. Merrill in 1 week, call to schedule the appointment.      SECONDARY DISCHARGE DIAGNOSES  Diagnosis: Closed traumatic displaced Colles' fracture of right radius  Assessment and Plan of Treatment: EDYTA LUIS in splint  - pain control  - upon discharge please follow up in 1 week with Dr. Vance at [address] [facility]; please call to schedule an appointment: [phone]

## 2024-03-15 NOTE — PHYSICAL THERAPY INITIAL EVALUATION ADULT - PERTINENT HX OF CURRENT PROBLEM, REHAB EVAL
Lab Results   Component Value Date    EGFR 45 12/17/2022    EGFR 41 12/16/2022    EGFR 35 12/15/2022    CREATININE 1 33 (H) 12/17/2022    CREATININE 1 42 (H) 12/16/2022    CREATININE 1 62 (H) 12/15/2022     Creatinine stable  Will avoid nephrotoxic medication  Encourage po hydration  Will monitor BMP  84 y/o F with a past medical history significant for Atrial fibrillation on xarelto, HTN, and s/p cholecystectomy x 30 yrs prior who presents to the ED as a transfer from [facility] as trauma alert with chief complaint of R wrist pain s/p mechanical fall.  Trauma assessment in ED: ABCs intact , GCS 15 , AAOx3. Patient states that while outside her home, she tripped over curb, and landed onto R side. Patient states that she fell unto her R hand as she tried to brace her fall. Patient states that she hit her face during fall, without LOC, patient currently taking xarelto, last dose 3/13. Patient was able to stand and ambulate with assistance after fall. Patient ambulates without assistance at baseline. Patient originally seen at [facility], where her R wrist was splinted. CT imaging at [facility] site showed evidence of thin acute subdural hemorrhage along the anterior falx which prompted transfer to [facility]. Patient currently endorsing moderate R wrist pain which is worse with movement or palpation, denies changes in sensation in the extremity Patient denies HA, dizziness, weakness, changes in vision, nausea/vomiting, changes in sensation. Patient denies fevers, chills, chest pain, palpitations, shortness of breath, abd pain.

## 2024-03-18 PROBLEM — I10 ESSENTIAL (PRIMARY) HYPERTENSION: Chronic | Status: ACTIVE | Noted: 2024-03-14

## 2024-03-18 PROBLEM — Z00.00 ENCOUNTER FOR PREVENTIVE HEALTH EXAMINATION: Status: ACTIVE | Noted: 2024-03-18

## 2024-03-18 PROBLEM — I48.91 UNSPECIFIED ATRIAL FIBRILLATION: Chronic | Status: ACTIVE | Noted: 2024-03-14

## 2024-03-20 ENCOUNTER — APPOINTMENT (OUTPATIENT)
Dept: ORTHOPEDIC SURGERY | Facility: CLINIC | Age: 84
End: 2024-03-20
Payer: MEDICARE

## 2024-03-20 VITALS — BODY MASS INDEX: 25.01 KG/M2 | HEIGHT: 68 IN | WEIGHT: 165 LBS

## 2024-03-20 PROCEDURE — 99204 OFFICE O/P NEW MOD 45 MIN: CPT

## 2024-03-20 PROCEDURE — 73110 X-RAY EXAM OF WRIST: CPT | Mod: RT

## 2024-03-20 NOTE — ASSESSMENT
[FreeTextEntry1] : The patient comes in after an injury to her right wrist. The patient states she fell on 3/14/24. The patient's fracture was reduced at the hospital. She was placed into a splint. The patient is accompanied by her daughter.   right upper extremity: In splint which is fitting her well, full range of motion of fingers out of splint, neurovascular intact  X-RAY right wrist 3 views well aligned fracture of the distal radius  Patient had a distal radius fracture.  The patient's fracture is in acceptable alignment. The patient will follow up next week for new films and repeat evaluation.  I discussed with the patient elevation and hand range of motion.  If she has any issues she will return sooner.  At her next visit as long as there is no change in alignment I will most likely change her into a fiberglass cast.

## 2024-03-22 DIAGNOSIS — R40.2412 GLASGOW COMA SCALE SCORE 13-15, AT ARRIVAL TO EMERGENCY DEPARTMENT: ICD-10-CM

## 2024-03-22 DIAGNOSIS — I50.22 CHRONIC SYSTOLIC (CONGESTIVE) HEART FAILURE: ICD-10-CM

## 2024-03-22 DIAGNOSIS — I11.0 HYPERTENSIVE HEART DISEASE WITH HEART FAILURE: ICD-10-CM
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
## 2024-03-22 DIAGNOSIS — J90 PLEURAL EFFUSION, NOT ELSEWHERE CLASSIFIED: ICD-10-CM

## 2024-03-22 DIAGNOSIS — S62.101A FRACTURE OF UNSPECIFIED CARPAL BONE, RIGHT WRIST, INITIAL ENCOUNTER FOR CLOSED FRACTURE: ICD-10-CM

## 2024-03-22 DIAGNOSIS — I48.20 CHRONIC ATRIAL FIBRILLATION, UNSPECIFIED: ICD-10-CM

## 2024-03-22 DIAGNOSIS — Z90.49 ACQUIRED ABSENCE OF OTHER SPECIFIED PARTS OF DIGESTIVE TRACT: ICD-10-CM

## 2024-03-22 DIAGNOSIS — S52.531A COLLES' FRACTURE OF RIGHT RADIUS, INITIAL ENCOUNTER FOR CLOSED FRACTURE: ICD-10-CM

## 2024-03-22 DIAGNOSIS — R74.01 ELEVATION OF LEVELS OF LIVER TRANSAMINASE LEVELS: ICD-10-CM
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
## 2024-03-22 DIAGNOSIS — S06.5X0A TRAUMATIC SUBDURAL HEMORRHAGE WITHOUT LOSS OF CONSCIOUSNESS, INITIAL ENCOUNTER: ICD-10-CM

## 2024-03-22 DIAGNOSIS — Z79.01 LONG TERM (CURRENT) USE OF ANTICOAGULANTS: ICD-10-CM

## 2024-03-27 ENCOUNTER — APPOINTMENT (OUTPATIENT)
Dept: NEUROLOGY | Facility: CLINIC | Age: 84
End: 2024-03-27

## 2024-03-28 ENCOUNTER — APPOINTMENT (OUTPATIENT)
Dept: NEUROSURGERY | Facility: CLINIC | Age: 84
End: 2024-03-28
Payer: MEDICARE

## 2024-03-28 VITALS — HEIGHT: 68 IN | WEIGHT: 168 LBS | BODY MASS INDEX: 25.46 KG/M2

## 2024-03-28 DIAGNOSIS — Z82.49 FAMILY HISTORY OF ISCHEMIC HEART DISEASE AND OTHER DISEASES OF THE CIRCULATORY SYSTEM: ICD-10-CM

## 2024-03-28 DIAGNOSIS — Z86.79 PERSONAL HISTORY OF OTHER DISEASES OF THE CIRCULATORY SYSTEM: ICD-10-CM

## 2024-03-28 DIAGNOSIS — Z82.0 FAMILY HISTORY OF EPILEPSY AND OTHER DISEASES OF THE NERVOUS SYSTEM: ICD-10-CM

## 2024-03-28 DIAGNOSIS — Z78.9 OTHER SPECIFIED HEALTH STATUS: ICD-10-CM

## 2024-03-28 DIAGNOSIS — Z63.4 DISAPPEARANCE AND DEATH OF FAMILY MEMBER: ICD-10-CM

## 2024-03-28 DIAGNOSIS — S06.5XAA TRAUMATIC SUBDURAL HEMORRHAGE WITH LOSS OF CONSCIOUSNESS STATUS UNKNOWN, INITIAL ENCOUNTER: ICD-10-CM

## 2024-03-28 PROCEDURE — 99213 OFFICE O/P EST LOW 20 MIN: CPT

## 2024-03-28 RX ORDER — SACUBITRIL AND VALSARTAN 24; 26 MG/1; MG/1
24-26 TABLET, FILM COATED ORAL
Refills: 0 | Status: ACTIVE | COMMUNITY

## 2024-03-28 RX ORDER — METOPROLOL TARTRATE 75 MG/1
TABLET, FILM COATED ORAL
Refills: 0 | Status: ACTIVE | COMMUNITY

## 2024-03-28 RX ORDER — FUROSEMIDE 20 MG/1
20 TABLET ORAL
Refills: 0 | Status: ACTIVE | COMMUNITY

## 2024-03-28 RX ORDER — RIVAROXABAN 20 MG/1
20 TABLET, FILM COATED ORAL
Refills: 0 | Status: ACTIVE | COMMUNITY

## 2024-03-28 SDOH — SOCIAL STABILITY - SOCIAL INSECURITY: DISSAPEARANCE AND DEATH OF FAMILY MEMBER: Z63.4

## 2024-03-28 NOTE — HISTORY OF PRESENT ILLNESS
[de-identified] : Ms. SOUSA is a pleasant 83-year-old RH female presenting to the neurosurgery office today alongside her daughter post hospital discharge for a fall on 3/14/2024.   She fell outside of her house hitting the right side of her body/head on the curb. -LOC. She takes Xarelto for afib, this was on hold for one week and then restarted as per cardiology. She has a follow up next week with cardiologist and was educated to mention and consider the Watchman Procedure in order to attempt to prevent further bleeds. Patient was grateful for this information. She is asymptomatic denying headaches, dizziness, visual disturbances or one-sided weakness. No seizures.    3/15/2024 CT head noncontrast: since 3/14/2024, the small anterior interhemispheric subdural hematoma has improved.  Ms. SOUSA was educated that the repeat CT scan performed on 3/15/2024 shows improvement and in the absence of any acute concerns, none of which were identified today, the patient may return to neurosurgery as needed going forward. She will continue her care with orthopedics for the right distal radius fracture. All questions were answered today and the patient was grateful for the visit.  Physical Exam: Constitutional: Well appearing, no distress HEENT: Normocephalic Atraumatic Psychiatric: Awake, alert, oriented to person, place, situation and time. Normal affect. Follows commands. Language: Speech is clear, fluent with good repetition & comprehension. No dysarthria. Pulmonary: No respiratory distress   Neurologic: CN II-XII grossly intact; EOMI with no nystagmus. No facial asymmetry bilaterally, full eye closure strength bilaterally. Hearing grossly normal. Head turning & shoulder shrug intact, bilaterally. Tongue midline, normal movements, no atrophy. Smile symmetrical. Palpation: No pain to palpation Strength: Full strength in all major muscle groups Sensation: Full sensation to light touch in all extremities, V1-V3 sensation bilaterally intact. Motor: No pronator drift, muscle strength of bilateral UE and bilateral LE: 5/5. Normal muscle tone. Reflexes: DTR of biceps, knee and ankle normal 2+ biceps 2+ triceps 2+ patellar   No Clonus No Frank's   ROM   Gait: No postural instability. Normal stance and walking without assistance.

## 2024-03-28 NOTE — ASSESSMENT
[FreeTextEntry1] : 82yo F post fall, SDH. Improvement noted on CT scan that was repeated on 3/15/2024. She resumed Xarelto. Patient may follow-up in our neurosurgery office on an as-needed basis going forward as there is no warrant for any further imaging at this time. She was grateful for the visit today and denied any questions at this time.  No concerns were identified during the visit and we thank her for allowing us to be a part of her care team.   I personally reviewed with the NP, this patient's history and physical exam findings, as documented above. I have discussed the relevant areas of concern, having direct implications to the presenting problems and illnesses, and I have personally examined all pertinent and positive and negative findings, which impact their neurosurgical treatment.   Samantha Hernandez MS, FNP-BC Ese Merrill MD, FAANS

## 2024-03-29 ENCOUNTER — APPOINTMENT (OUTPATIENT)
Dept: ORTHOPEDIC SURGERY | Facility: CLINIC | Age: 84
End: 2024-03-29
Payer: MEDICARE

## 2024-03-29 PROCEDURE — 99213 OFFICE O/P EST LOW 20 MIN: CPT | Mod: 25

## 2024-03-29 PROCEDURE — 73110 X-RAY EXAM OF WRIST: CPT | Mod: RT

## 2024-03-29 PROCEDURE — 29075 APPL CST ELBW FNGR SHORT ARM: CPT | Mod: RT

## 2024-03-29 NOTE — ASSESSMENT
[FreeTextEntry1] : The patient comes in for follow up. The patient is doing well. The patient is accompanied by her daughter.   right upper extremity: In splint which is fitting her well, full range of motion of fingers out of splint, neurovascular intact  X-RAY right wrist 3 views well aligned fracture of the distal radius  The patient was placed into a fiberglass short arm cast. The patient will follow up in 2 weeks for cast removal and repeat evaluation.   A cast was applied.  The importance of ice and elevation were discussed with the patient.  The risks were also discussed such as compartment syndrome and skin breakdown.  They were instructed to never put foreign objects down the cast.  Patients should call for increasing pain, worsening swelling, numbness, extremity discoloration, or any other concerns.

## 2024-04-12 ENCOUNTER — APPOINTMENT (OUTPATIENT)
Dept: ORTHOPEDIC SURGERY | Facility: CLINIC | Age: 84
End: 2024-04-12
Payer: MEDICARE

## 2024-04-12 PROCEDURE — 99213 OFFICE O/P EST LOW 20 MIN: CPT

## 2024-04-12 PROCEDURE — 73110 X-RAY EXAM OF WRIST: CPT | Mod: RT

## 2024-04-12 NOTE — ASSESSMENT
[FreeTextEntry1] : The patient comes in for follow up. The patient is doing well. The patient's cast was removed today. The patient is accompanied by her daughter.   right upper extremity:  cast removed, skin intact, full range of motion of fingers, decreased range of motion of wrist, nontender palpation over distal radius, neurovascular intact  X-RAY right wrist 3 views well aligned fracture of the distal radius  The patient's cast was removed today. The patient was placed into a cock-up brace. She will remove the brace for bathing and gentle range of motion. The patient will follow up in 3 weeks for repeat evaluation.

## 2024-05-03 ENCOUNTER — APPOINTMENT (OUTPATIENT)
Dept: ORTHOPEDIC SURGERY | Facility: CLINIC | Age: 84
End: 2024-05-03
Payer: MEDICARE

## 2024-05-03 DIAGNOSIS — S52.531A COLLES' FRACTURE OF RIGHT RADIUS, INITIAL ENCOUNTER FOR CLOSED FRACTURE: ICD-10-CM

## 2024-05-03 PROCEDURE — 99213 OFFICE O/P EST LOW 20 MIN: CPT

## 2024-05-05 PROBLEM — S52.531A CLOSED COLLES' FRACTURE OF RIGHT RADIUS, INITIAL ENCOUNTER: Status: ACTIVE | Noted: 2024-03-20

## 2024-05-05 NOTE — ASSESSMENT
[FreeTextEntry1] : Patient comes in for follow-up status post right distal radius fracture.  She is doing well.  She does not have complaints.  She has been working on range of motion.  She has been using the cock-up wrist splint at times.  Right upper extremity: No gross deformities visualized, nontender palpation along the distal radius, good range of motion of wrist, neurovascular intact  X-rays of the right wrist 3 views show a well aligned distal radius fracture which is healed  Status post right distal radius fracture.  The patient is doing well.  She will continue working on range of motion on her own.  If she finds that she would like to do therapy she will let me know.  She will follow-up on an as-needed basis.
